# Patient Record
Sex: FEMALE | Race: ASIAN | NOT HISPANIC OR LATINO
[De-identification: names, ages, dates, MRNs, and addresses within clinical notes are randomized per-mention and may not be internally consistent; named-entity substitution may affect disease eponyms.]

---

## 2019-02-04 ENCOUNTER — NON-APPOINTMENT (OUTPATIENT)
Age: 49
End: 2019-02-04

## 2019-02-04 ENCOUNTER — APPOINTMENT (OUTPATIENT)
Dept: CARDIOLOGY | Facility: CLINIC | Age: 49
End: 2019-02-04
Payer: COMMERCIAL

## 2019-02-04 VITALS
OXYGEN SATURATION: 98 % | HEIGHT: 64 IN | DIASTOLIC BLOOD PRESSURE: 100 MMHG | BODY MASS INDEX: 37.39 KG/M2 | SYSTOLIC BLOOD PRESSURE: 164 MMHG | WEIGHT: 219 LBS | HEART RATE: 71 BPM

## 2019-02-04 DIAGNOSIS — Z83.3 FAMILY HISTORY OF DIABETES MELLITUS: ICD-10-CM

## 2019-02-04 DIAGNOSIS — Z87.442 PERSONAL HISTORY OF URINARY CALCULI: ICD-10-CM

## 2019-02-04 DIAGNOSIS — Z82.49 FAMILY HISTORY OF ISCHEMIC HEART DISEASE AND OTHER DISEASES OF THE CIRCULATORY SYSTEM: ICD-10-CM

## 2019-02-04 DIAGNOSIS — Z82.5 FAMILY HISTORY OF ASTHMA AND OTHER CHRONIC LOWER RESPIRATORY DISEASES: ICD-10-CM

## 2019-02-04 DIAGNOSIS — Z82.3 FAMILY HISTORY OF STROKE: ICD-10-CM

## 2019-02-04 PROCEDURE — 36415 COLL VENOUS BLD VENIPUNCTURE: CPT

## 2019-02-04 PROCEDURE — 99205 OFFICE O/P NEW HI 60 MIN: CPT

## 2019-02-04 PROCEDURE — 93000 ELECTROCARDIOGRAM COMPLETE: CPT

## 2019-02-05 LAB
ALBUMIN SERPL ELPH-MCNC: 4.4 G/DL
ALP BLD-CCNC: 74 U/L
ALT SERPL-CCNC: 32 U/L
ANION GAP SERPL CALC-SCNC: 13 MMOL/L
AST SERPL-CCNC: 32 U/L
BASOPHILS # BLD AUTO: 0.02 K/UL
BASOPHILS NFR BLD AUTO: 0.2 %
BILIRUB SERPL-MCNC: 0.4 MG/DL
BUN SERPL-MCNC: 16 MG/DL
CALCIUM SERPL-MCNC: 9.6 MG/DL
CHLORIDE SERPL-SCNC: 100 MMOL/L
CHOLEST SERPL-MCNC: 152 MG/DL
CHOLEST/HDLC SERPL: 3.3 RATIO
CO2 SERPL-SCNC: 27 MMOL/L
CREAT SERPL-MCNC: 0.8 MG/DL
EOSINOPHIL # BLD AUTO: 0.45 K/UL
EOSINOPHIL NFR BLD AUTO: 4.8 %
GLUCOSE SERPL-MCNC: 103 MG/DL
HCT VFR BLD CALC: 42.7 %
HDLC SERPL-MCNC: 46 MG/DL
HGB BLD-MCNC: 13.3 G/DL
IMM GRANULOCYTES NFR BLD AUTO: 0.6 %
LDLC SERPL CALC-MCNC: 87 MG/DL
LDLC SERPL DIRECT ASSAY-MCNC: 96 MG/DL
LYMPHOCYTES # BLD AUTO: 2.61 K/UL
LYMPHOCYTES NFR BLD AUTO: 27.8 %
MAN DIFF?: NORMAL
MCHC RBC-ENTMCNC: 28.5 PG
MCHC RBC-ENTMCNC: 31.1 GM/DL
MCV RBC AUTO: 91.4 FL
MONOCYTES # BLD AUTO: 0.47 K/UL
MONOCYTES NFR BLD AUTO: 5 %
NEUTROPHILS # BLD AUTO: 5.78 K/UL
NEUTROPHILS NFR BLD AUTO: 61.6 %
PLATELET # BLD AUTO: 257 K/UL
POTASSIUM SERPL-SCNC: 3.8 MMOL/L
PROT SERPL-MCNC: 7.7 G/DL
RBC # BLD: 4.67 M/UL
RBC # FLD: 13.7 %
SODIUM SERPL-SCNC: 140 MMOL/L
TRIGL SERPL-MCNC: 97 MG/DL
WBC # FLD AUTO: 9.39 K/UL

## 2019-02-05 NOTE — REASON FOR VISIT
[FreeTextEntry1] : Rachel Brown presents today for cardiac evaluation regarding HTN and palpitations.

## 2019-02-05 NOTE — DISCUSSION/SUMMARY
[FreeTextEntry1] : Palps - likely due to VPCs, based on recording patient brings with her.\par Will have her return for an echo to assess for hypertensive changes or other structural disease\par Will arrange for an event monitor for four weeks.\par \par HTN\par Continue current meds.\par Continue to monitor BP at home\par TTE to assess for hypertensive changes.\par Reviewed need for weight loss; suggested low fat or Weight Watchers diet.\par Low salt diet.\par \par Overweight; as above\par \par WIll check CBC, CMP and lipid panel.\par \par Return OV in one month

## 2019-02-05 NOTE — PHYSICAL EXAM
[Oriented To Time, Place, And Person] : oriented to person, place, and time [Impaired Insight] : insight and judgment were intact [Affect] : the affect was normal [Mood] : the mood was normal [General Appearance - Well Developed] : well developed [Normal Appearance] : normal appearance [Well Groomed] : well groomed [General Appearance - Well Nourished] : well nourished [No Deformities] : no deformities [General Appearance - In No Acute Distress] : no acute distress [Normal Conjunctiva] : the conjunctiva exhibited no abnormalities [Eyelids - No Xanthelasma] : the eyelids demonstrated no xanthelasmas [Normal Jugular Venous A Waves Present] : normal jugular venous A waves present [Normal Jugular Venous V Waves Present] : normal jugular venous V waves present [No Jugular Venous Giles A Waves] : no jugular venous giles A waves [Heart Rate And Rhythm] : heart rate and rhythm were normal [Respiration, Rhythm And Depth] : normal respiratory rhythm and effort [Exaggerated Use Of Accessory Muscles For Inspiration] : no accessory muscle use [Auscultation Breath Sounds / Voice Sounds] : lungs were clear to auscultation bilaterally [Bowel Sounds] : normal bowel sounds [Abnormal Walk] : normal gait [Nail Clubbing] : no clubbing of the fingernails [Cyanosis, Localized] : no localized cyanosis [Skin Color & Pigmentation] : normal skin color and pigmentation [] : no rash [FreeTextEntry1] : Trace edema, L>R

## 2019-02-05 NOTE — HISTORY OF PRESENT ILLNESS
[FreeTextEntry1] : Rachel Brown is a 48 year woman with a history of HTN over the past 10 years, treated with medication. Blood pressure has been reasonably well controlled; she tells me that the systolic reading can vary between 130 and 150, over diastolic readings of 60.\par \par She has a history of palpitations, typically occurring once or twice each week. 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 recently; she put on a telemetry monitor, which showed an isolated ventricular premature beat at the time of her symptoms.  Also, every 2 or 3 months, she has a stronger sense of palpitations, which require that she sit and rest. These episodes can last up to an hour, and she takes an extra 25 mg of Lopressor for relief.  These strong episodes of palpitations are accompanied by a mid-chest discomfort and sense of breathlessness. She remains active, working full time as a nurse; with her usual activities, she describes no exertional chest discomfort or dyspnea. She describes no episodes of loss of consciousness. She has never been told of a heart murmur in the past.\par \par There is no history of diabetes or hyperlipidemia. She has never been a cigarette smoker. There is no family history of premature heart disease.

## 2019-02-05 NOTE — ASSESSMENT
[FreeTextEntry1] : Palps - rhythm  strip from hospital showed isolated VPC.\par Will arrange for an event monitor to carry for four weeks.\par \par HTN - only fare control.  To continue current meds for now.  Will return for an echo to assess for hypertensive changes or for other structural abnormality.\par \par Overweight -  reviewed need for weight loss.\par \par Will check CBC, CMP and lipid panel.\par \par Return ov in one month to review echo and rhythm recordings; recheck BP.

## 2019-02-19 ENCOUNTER — APPOINTMENT (OUTPATIENT)
Dept: CARDIOLOGY | Facility: CLINIC | Age: 49
End: 2019-02-19
Payer: COMMERCIAL

## 2019-02-19 VITALS — SYSTOLIC BLOOD PRESSURE: 146 MMHG | DIASTOLIC BLOOD PRESSURE: 90 MMHG

## 2019-02-19 VITALS — SYSTOLIC BLOOD PRESSURE: 202 MMHG | DIASTOLIC BLOOD PRESSURE: 98 MMHG | HEART RATE: 72 BPM

## 2019-02-19 PROCEDURE — 93306 TTE W/DOPPLER COMPLETE: CPT

## 2019-03-04 ENCOUNTER — NON-APPOINTMENT (OUTPATIENT)
Age: 49
End: 2019-03-04

## 2019-03-04 ENCOUNTER — APPOINTMENT (OUTPATIENT)
Dept: CARDIOLOGY | Facility: CLINIC | Age: 49
End: 2019-03-04
Payer: COMMERCIAL

## 2019-03-04 VITALS
SYSTOLIC BLOOD PRESSURE: 172 MMHG | HEART RATE: 96 BPM | HEIGHT: 64 IN | BODY MASS INDEX: 37.39 KG/M2 | RESPIRATION RATE: 14 BRPM | DIASTOLIC BLOOD PRESSURE: 90 MMHG | WEIGHT: 219 LBS | OXYGEN SATURATION: 92 %

## 2019-03-04 PROCEDURE — 93000 ELECTROCARDIOGRAM COMPLETE: CPT

## 2019-03-04 PROCEDURE — 99215 OFFICE O/P EST HI 40 MIN: CPT

## 2019-03-04 NOTE — HISTORY OF PRESENT ILLNESS
[FreeTextEntry1] : She has a history of HTN for 10 years.  She has a history of palpitations, typically occurring once or twice each week. 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  She still has been experiencing palpitations and has been using the event monitor.  She reports no exertional chest discomfort or dyspnea. She describes no lightheadedness or LOC.  \par \par She continues to tolerate her meds without problem.

## 2019-03-04 NOTE — ASSESSMENT
[FreeTextEntry1] : Palps - rhythm  strip from hospital showed isolated VPCs.\par Will arrange for an event monitor to carry for four weeks.\par \par HTN - only fare control.  To continue current meds for now.  Will return for an echo to assess for hypertensive changes or for other structural abnormality.\par \par Overweight -  reviewed need for weight loss.\par \par Will check CBC, CMP and lipid panel.\par \par Return ov in one month to review echo and rhythm recordings; recheck BP.

## 2019-03-04 NOTE — DISCUSSION/SUMMARY
[FreeTextEntry1] : Palps - event monitor demonstrates that atrial ectopy including brief runs of PAT which appears to be the cause of Rachel's symptoms.  \par \par I reviewed that finding of her recent TTE with her; it demonstrates mild hypertensive findings, with evidence of mild LVH and mild diastolic dysfunction, which likely contributes to her propensity to atrial ectopy. \par \par She will increase metoprolol ER to 100 mg. qd., to better suppress her ectopy and to help with BP control. I explained the common side effects of fatigue and sexual dysfunction.\par \par She may return the event monitor.\par \par HTN - continue losartan/HCT and metoprolol.  Continue low salt diet.\par \par I reviewed the need to lose weight, as this is contributing to her HTN.  I suggested that she try Weight Watchers.\par \par Reviewed recent blood work, which was favorable.\par \par Return OV in one month.

## 2019-03-04 NOTE — PHYSICAL EXAM
[General Appearance - Well Developed] : well developed [Normal Appearance] : normal appearance [Well Groomed] : well groomed [General Appearance - Well Nourished] : well nourished [No Deformities] : no deformities [General Appearance - In No Acute Distress] : no acute distress [Normal Conjunctiva] : the conjunctiva exhibited no abnormalities [Eyelids - No Xanthelasma] : the eyelids demonstrated no xanthelasmas [Normal Jugular Venous A Waves Present] : normal jugular venous A waves present [Normal Jugular Venous V Waves Present] : normal jugular venous V waves present [No Jugular Venous Giles A Waves] : no jugular venous giles A waves [Respiration, Rhythm And Depth] : normal respiratory rhythm and effort [Exaggerated Use Of Accessory Muscles For Inspiration] : no accessory muscle use [Auscultation Breath Sounds / Voice Sounds] : lungs were clear to auscultation bilaterally [Heart Rate And Rhythm] : heart rate and rhythm were normal [Bowel Sounds] : normal bowel sounds [Abnormal Walk] : normal gait [Nail Clubbing] : no clubbing of the fingernails [Cyanosis, Localized] : no localized cyanosis [Skin Color & Pigmentation] : normal skin color and pigmentation [] : no rash [Oriented To Time, Place, And Person] : oriented to person, place, and time [Impaired Insight] : insight and judgment were intact [Affect] : the affect was normal [Mood] : the mood was normal [FreeTextEntry1] : Trace edema, L>R

## 2019-04-02 ENCOUNTER — APPOINTMENT (OUTPATIENT)
Dept: CARDIOLOGY | Facility: CLINIC | Age: 49
End: 2019-04-02
Payer: COMMERCIAL

## 2019-04-02 ENCOUNTER — NON-APPOINTMENT (OUTPATIENT)
Age: 49
End: 2019-04-02

## 2019-04-02 VITALS
HEART RATE: 62 BPM | DIASTOLIC BLOOD PRESSURE: 91 MMHG | SYSTOLIC BLOOD PRESSURE: 158 MMHG | BODY MASS INDEX: 36.19 KG/M2 | RESPIRATION RATE: 14 BRPM | WEIGHT: 212 LBS | HEIGHT: 64 IN

## 2019-04-02 PROCEDURE — 93000 ELECTROCARDIOGRAM COMPLETE: CPT

## 2019-04-02 PROCEDURE — 99214 OFFICE O/P EST MOD 30 MIN: CPT

## 2019-04-02 RX ORDER — METOPROLOL SUCCINATE 50 MG/1
50 TABLET, EXTENDED RELEASE ORAL
Qty: 90 | Refills: 1 | Status: DISCONTINUED | COMMUNITY
End: 2019-04-02

## 2019-04-02 NOTE — DISCUSSION/SUMMARY
[FreeTextEntry1] : Palps - event monitor demonstrated that atrial ectopy, including brief runs of PAT.  Adequate suppressed with beta blocker.\par \par HTN and mild hypertensive dz./diastolic dysfunction; BP not ideally controlled.  Will switch to carvedilol ER at 20 mg per day, for additional BP benefit and to continue beta blocker for palps.  Continue low salt diet.\par \par I commended her for losing 7 lbs. since her last visit; I encouraged her to lose more weight.\par \par She will return in six weeks.

## 2019-04-02 NOTE — HISTORY OF PRESENT ILLNESS
[FreeTextEntry1] : She has a history of HTN for 10 years.  She has a history of palpitations, typically occurring once or twice each week. 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  She reports few palpitations and is tolerating the higher dose of metoprolol without problem.  She reports no exertional chest discomfort or dyspnea. She describes no lightheadedness or LOC.  \par \par There have been no new interval medical problems.

## 2019-05-15 ENCOUNTER — APPOINTMENT (OUTPATIENT)
Dept: CARDIOLOGY | Facility: CLINIC | Age: 49
End: 2019-05-15

## 2019-05-15 NOTE — HISTORY OF PRESENT ILLNESS
[FreeTextEntry1] : She has a history of HTN.  She has a history of palpitations, typically occurring once or twice each week. 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  She reports few palpitations and is tolerating the higher dose of metoprolol without problem.  She reports no exertional chest discomfort or dyspnea. She describes no lightheadedness or LOC.  \par \par There have been no new interval medical problems.

## 2019-05-15 NOTE — PHYSICAL EXAM
[General Appearance - Well Developed] : well developed [Normal Appearance] : normal appearance [General Appearance - Well Nourished] : well nourished [Well Groomed] : well groomed [General Appearance - In No Acute Distress] : no acute distress [No Deformities] : no deformities [Eyelids - No Xanthelasma] : the eyelids demonstrated no xanthelasmas [Normal Conjunctiva] : the conjunctiva exhibited no abnormalities [Normal Jugular Venous V Waves Present] : normal jugular venous V waves present [Normal Jugular Venous A Waves Present] : normal jugular venous A waves present [No Jugular Venous Giles A Waves] : no jugular venous giles A waves [Respiration, Rhythm And Depth] : normal respiratory rhythm and effort [Exaggerated Use Of Accessory Muscles For Inspiration] : no accessory muscle use [Auscultation Breath Sounds / Voice Sounds] : lungs were clear to auscultation bilaterally [Heart Rate And Rhythm] : heart rate and rhythm were normal [Bowel Sounds] : normal bowel sounds [Abnormal Walk] : normal gait [Nail Clubbing] : no clubbing of the fingernails [Cyanosis, Localized] : no localized cyanosis [Skin Color & Pigmentation] : normal skin color and pigmentation [] : no rash [Impaired Insight] : insight and judgment were intact [Oriented To Time, Place, And Person] : oriented to person, place, and time [Mood] : the mood was normal [Affect] : the affect was normal [FreeTextEntry1] : Trace edema, L>R

## 2019-05-15 NOTE — ASSESSMENT
[FreeTextEntry1] : Palps - rhythm  strip from hospital showed isolated VPCs.  Event monitor showed episodes of APCs, PAT and occ. PVCs.\par \par HTN, mild concentric LVH, mild diastolic dysfunction\par \par Overweight

## 2019-05-15 NOTE — DISCUSSION/SUMMARY
[FreeTextEntry1] : PRELIMINARY NOTE\par \par \par \par Palps - event monitor demonstrated that atrial ectopy, brief runs of PAT and occ. VPCs.  Adequate suppressed with beta blocker.\par \par HTN and mild hypertensive dz./diastolic dysfunction; BP not ideally controlled.  Will switch to carvedilol ER at 20 mg per day, for additional BP benefit and to continue beta blocker for palps.  Continue low salt diet.\par \par I commended her for losing 7 lbs. since her last visit; I encouraged her to lose more weight.\par \par She will return in six weeks.

## 2019-07-22 ENCOUNTER — APPOINTMENT (OUTPATIENT)
Dept: CARDIOLOGY | Facility: CLINIC | Age: 49
End: 2019-07-22
Payer: COMMERCIAL

## 2019-07-22 NOTE — DISCUSSION/SUMMARY
[FreeTextEntry1] : PRELIMINARY NOTE \par \par \par Palps - event monitor demonstrated that atrial ectopy, including brief runs of PAT.  Adequate suppressed with beta blocker.\par \par HTN and mild hypertensive dz./diastolic dysfunction; BP not ideally controlled.  Will switch to carvedilol ER at 20 mg per day, for additional BP benefit and to continue beta blocker for palps.  Continue low salt diet.\par \par I commended her for losing 7 lbs. since her last visit; I encouraged her to lose more weight.\par \par She will return in six weeks.

## 2019-07-22 NOTE — PHYSICAL EXAM
[General Appearance - Well Developed] : well developed [Normal Appearance] : normal appearance [Well Groomed] : well groomed [General Appearance - Well Nourished] : well nourished [No Deformities] : no deformities [General Appearance - In No Acute Distress] : no acute distress [Normal Conjunctiva] : the conjunctiva exhibited no abnormalities [Eyelids - No Xanthelasma] : the eyelids demonstrated no xanthelasmas [Normal Jugular Venous A Waves Present] : normal jugular venous A waves present [Normal Jugular Venous V Waves Present] : normal jugular venous V waves present [No Jugular Venous Giles A Waves] : no jugular venous giles A waves [Respiration, Rhythm And Depth] : normal respiratory rhythm and effort [Exaggerated Use Of Accessory Muscles For Inspiration] : no accessory muscle use [Auscultation Breath Sounds / Voice Sounds] : lungs were clear to auscultation bilaterally [Heart Rate And Rhythm] : heart rate and rhythm were normal [Bowel Sounds] : normal bowel sounds [Abnormal Walk] : normal gait [Nail Clubbing] : no clubbing of the fingernails [Cyanosis, Localized] : no localized cyanosis [FreeTextEntry1] : Trace edema, L>R [Skin Color & Pigmentation] : normal skin color and pigmentation [] : no rash [Impaired Insight] : insight and judgment were intact [Oriented To Time, Place, And Person] : oriented to person, place, and time [Affect] : the affect was normal [Mood] : the mood was normal

## 2019-07-22 NOTE — REASON FOR VISIT
[FreeTextEntry1] : PRELIMINARY NOTE\par \par \par Rachel Brown returns for f/u regarding HTN and palpitations.

## 2019-07-22 NOTE — ASSESSMENT
[FreeTextEntry1] : Palps - event monitor showed SVPBs and short runs of PAT; rare BPBs seen.\par \par HTN.\par \par Overweight

## 2019-07-22 NOTE — HISTORY OF PRESENT ILLNESS
[FreeTextEntry1] : She has a history of HTN.  She has a history of palpitations, typically occurring once or twice each week. 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  She reports few palpitations and is tolerating the higher dose of metoprolol without problem.  She reports no exertional chest discomfort or dyspnea. She describes no lightheadedness or LOC.  \par \par There have been no new interval medical problems.

## 2019-09-18 ENCOUNTER — MEDICATION RENEWAL (OUTPATIENT)
Age: 49
End: 2019-09-18

## 2019-09-24 ENCOUNTER — NON-APPOINTMENT (OUTPATIENT)
Age: 49
End: 2019-09-24

## 2019-09-24 ENCOUNTER — APPOINTMENT (OUTPATIENT)
Dept: CARDIOLOGY | Facility: CLINIC | Age: 49
End: 2019-09-24
Payer: COMMERCIAL

## 2019-09-24 VITALS
DIASTOLIC BLOOD PRESSURE: 86 MMHG | BODY MASS INDEX: 36.7 KG/M2 | OXYGEN SATURATION: 94 % | WEIGHT: 215 LBS | SYSTOLIC BLOOD PRESSURE: 153 MMHG | TEMPERATURE: 98.1 F | HEART RATE: 68 BPM | RESPIRATION RATE: 17 BRPM | HEIGHT: 64 IN

## 2019-09-24 PROCEDURE — 93000 ELECTROCARDIOGRAM COMPLETE: CPT

## 2019-09-24 PROCEDURE — 99214 OFFICE O/P EST MOD 30 MIN: CPT

## 2019-09-24 RX ORDER — LOSARTAN POTASSIUM AND HYDROCHLOROTHIAZIDE 25; 100 MG/1; MG/1
100-25 TABLET ORAL DAILY
Qty: 90 | Refills: 1 | Status: DISCONTINUED | COMMUNITY
End: 2019-09-24

## 2019-09-24 NOTE — DISCUSSION/SUMMARY
[FreeTextEntry1] : \par Palps - event monitor demonstrated atrial ectopy, including brief runs of PAT. Adequately suppressed with carvedilol.\par \par HTN and mild hypertensive dz./diastolic dysfunction; BP not ideally controlled based on readings in office.  Blood pressure readings at home seem to be more typically in range.  For now we will continue carvedilol ER at 20 mg per day, but will switch to telmisartan HCT, given recent issues with further recall of losartan.  Continue low salt diet.\par If necessary, will consider increasing Coreg ER to 40 mg at next visit.\par \par I again reminded her of the importance of weight loss as this would certainly help her blood pressure.  \par \par She will return in 2 months.

## 2019-09-24 NOTE — HISTORY OF PRESENT ILLNESS
[FreeTextEntry1] : She has a history of HTN for 10 years.  She has a history of palpitations, typically occurring once or twice each week. \par \par I saw her last in April  As she returns today, she remains well.  She continues all of her usual activities.  Her palpitations seem adequately suppressed by carvedilol.  She seems to be tolerating it without problem.  She remains on losartan HCT as well.\par \par She does check her blood pressure periodically at home.  Systolic readings range from a low of 106 to a high of approximately 150.  Diastolic readings have been within normal range.\par \par She reports no exertional chest discomfort or dyspnea. She describes no lightheadedness or LOC.

## 2019-09-24 NOTE — ASSESSMENT
[FreeTextEntry1] : Palps - rhythm  strip from hospital showed isolated VPCs; event monitor showed brief runs of PAT.\par \par HTN/mild hypertensive heart disease.\par \par Overweight.\par

## 2019-11-22 ENCOUNTER — APPOINTMENT (OUTPATIENT)
Dept: CARDIOLOGY | Facility: CLINIC | Age: 49
End: 2019-11-22

## 2019-11-22 NOTE — HISTORY OF PRESENT ILLNESS
[FreeTextEntry1] : She has a history of HTN for 10 years.  She has a history of palpitations, typically occurring once or twice each week. \par \par I saw her last in Sept.  As she returns today, \par \par \par she remains well.  She continues all of her usual activities.  Her palpitations seem adequately suppressed by carvedilol.  She seems to be tolerating it without problem.  She remains on losartan HCT as well.\par \par She does check her blood pressure periodically at home.  Systolic readings range from a low of 106 to a high of approximately 150.  Diastolic readings have been within normal range.\par \par She reports no exertional chest discomfort or dyspnea. She describes no lightheadedness or LOC.

## 2019-11-22 NOTE — REASON FOR VISIT
[FreeTextEntry1] : PATIENT CANCELLED APPT. \par \par \par \par PRELIMINARY NOTE\par \par \par \par Rachel Brown returns for f/u regarding HTN and palpitations.

## 2019-11-22 NOTE — DISCUSSION/SUMMARY
[FreeTextEntry1] : PRELIMINARY NOTE\par \par \par Palps - event monitor demonstrated atrial ectopy, including brief runs of PAT. Adequately suppressed with carvedilol.\par \par HTN and mild hypertensive dz./diastolic dysfunction; BP not ideally controlled based on readings in office.  Blood pressure readings at home seem to be more typically in range.  For now we will continue carvedilol ER at 20 mg per day, but will switch to telmisartan HCT, given recent issues with further recall of losartan.  Continue low salt diet.\par If necessary, will consider increasing Coreg ER to 40 mg at next visit.\par \par I again reminded her of the importance of weight loss as this would certainly help her blood pressure.  \par \par She will return in 2 months.

## 2020-01-08 ENCOUNTER — TRANSCRIPTION ENCOUNTER (OUTPATIENT)
Age: 50
End: 2020-01-08

## 2020-04-25 ENCOUNTER — MESSAGE (OUTPATIENT)
Age: 50
End: 2020-04-25

## 2020-05-05 ENCOUNTER — APPOINTMENT (OUTPATIENT)
Dept: DISASTER EMERGENCY | Facility: CLINIC | Age: 50
End: 2020-05-05

## 2020-06-29 ENCOUNTER — APPOINTMENT (OUTPATIENT)
Dept: CARDIOLOGY | Facility: CLINIC | Age: 50
End: 2020-06-29
Payer: COMMERCIAL

## 2020-06-29 ENCOUNTER — NON-APPOINTMENT (OUTPATIENT)
Age: 50
End: 2020-06-29

## 2020-06-29 VITALS
OXYGEN SATURATION: 97 % | TEMPERATURE: 97.7 F | DIASTOLIC BLOOD PRESSURE: 86 MMHG | HEART RATE: 68 BPM | HEIGHT: 64 IN | SYSTOLIC BLOOD PRESSURE: 128 MMHG | WEIGHT: 215 LBS | BODY MASS INDEX: 36.7 KG/M2

## 2020-06-29 DIAGNOSIS — Z00.00 ENCOUNTER FOR GENERAL ADULT MEDICAL EXAMINATION W/OUT ABNORMAL FINDINGS: ICD-10-CM

## 2020-06-29 PROCEDURE — 99214 OFFICE O/P EST MOD 30 MIN: CPT

## 2020-06-29 PROCEDURE — 93000 ELECTROCARDIOGRAM COMPLETE: CPT

## 2020-06-29 RX ORDER — BENZONATATE 200 MG/1
200 CAPSULE ORAL
Qty: 21 | Refills: 0 | Status: COMPLETED | COMMUNITY
Start: 2020-01-08

## 2020-06-29 RX ORDER — MECLIZINE HYDROCHLORIDE 12.5 MG/1
12.5 TABLET ORAL
Qty: 30 | Refills: 0 | Status: COMPLETED | COMMUNITY
Start: 2020-01-15

## 2020-06-29 RX ORDER — OSELTAMIVIR PHOSPHATE 75 MG/1
75 CAPSULE ORAL
Qty: 10 | Refills: 0 | Status: COMPLETED | COMMUNITY
Start: 2020-01-08

## 2020-06-29 RX ORDER — HYDROCHLOROTHIAZIDE 25 MG/1
25 TABLET ORAL
Qty: 30 | Refills: 0 | Status: COMPLETED | COMMUNITY
Start: 2019-11-22

## 2020-06-29 RX ORDER — METHYLPREDNISOLONE 4 MG/1
4 TABLET ORAL
Qty: 21 | Refills: 0 | Status: COMPLETED | COMMUNITY
Start: 2020-01-15

## 2020-06-29 RX ORDER — CROMOLYN SODIUM 5.2 MG/ML
5.2 SPRAY, METERED NASAL
Qty: 26 | Refills: 0 | Status: COMPLETED | COMMUNITY
Start: 2020-01-08

## 2020-06-29 RX ORDER — TELMISARTAN 80 MG/1
80 TABLET ORAL
Qty: 30 | Refills: 0 | Status: COMPLETED | COMMUNITY
Start: 2020-04-02

## 2020-06-29 RX ORDER — DIPHENHYDRAMINE HYDROCHLORIDE AND LIDOCAINE HYDROCHLORIDE AND ALUMINUM HYDROXIDE AND MAGNESIUM HYDRO
KIT
Qty: 237 | Refills: 0 | Status: COMPLETED | COMMUNITY
Start: 2020-01-08

## 2020-06-29 NOTE — ASSESSMENT
H&P- Cameron Thompson 11/15/1924, 80 y o  male MRN: [id number]    Unit/Bed#: -01 Encounter: [id number]    Primary Care Provider: Snadra Johnson   Date and time admitted to hospital: 2/16/2020  5:10 PM        * ELEAZAR (acute kidney injury) [facility])  Assessment & Plan  · Placed in observation Medicine  · Likely prerenal secondary to volume depletion  · Hydrate with normal saline at 100 cc/hour  · Will hold pre-hospital Lasix  · Consult Nephrology in a m  Diarrhea of presumed infectious origin  Assessment & Plan  · Will place on contact precautions  · Stool studies are currently pending    Chronic systolic CHF (congestive heart failure) (Formerly [facility])  Assessment & Plan  Wt Readings from Last 3 Encounters:   02/16/20 68 1 kg (150 lb 2 1 oz)   07/30/19 68 9 kg (152 lb)   01/25/19 68 kg (150 lb)     Will continue pre-hospital Lopressor 25 mg p o  B i d , Lasix is currently on hold secondary to acute kidney injury, will obtain daily weights        Hypercholesterolemia  Assessment & Plan  Substitute Pravachol 80 mg p o  Daily for pre-hospital Zocor    DVT (deep venous thrombosis) ([facility])  Assessment & Plan  Continue pre-hospital Eliquis 5 mg p o  Daily    COPD (chronic obstructive pulmonary disease) (Formerly [facility])  Assessment & Plan  Continue pre-hospital Symbicort 160/4 52 puffs b i d  And give Duo nebs duo nebs q 6 hours while awake  VTE Prophylaxis: Apixaban (Eliquis)  Code Status:  Level 3  POLST: There is no POLST form on file for this patient (pre-hospital)  Discussion with family:  None present at bedside at time of exam    Anticipated Length of Stay:  Patient will be admitted on an Observation basis with an anticipated length of stay of  < 2 midnights  Justification for Hospital Stay:  Acute kidney injury likely secondary to dehydration from diarrhea requiring IV fluid resuscitation and further evaluation    Chief Complaint:   Diarrhea times 5 days    History of Present Illness:    Cameron Thompson is a 80 y  o  [FreeTextEntry1] : Palps - rhythm  strip from hospital showed isolated VPCs; event monitor showed brief runs of PAT.\par \par HTN/mild hypertensive heart disease.\par \par Overweight.\par  male who presents with diarrhea x5 days  Patient reports over the past 5 days he has had multiple episodes of diarrhea approximately 4-6 times per day which is watery in nature and not accompanied with any blood or mucus  Patient denies any abdominal pain, no fever chills, no recent sick contacts were bad food exposure  Patient denies any recent antibiotic usage  Review of Systems:  Review of Systems   Constitutional: Positive for fatigue  Negative for appetite change, chills and fever  Respiratory: Negative for cough, shortness of breath and wheezing  Cardiovascular: Negative for chest pain and palpitations  Gastrointestinal: Positive for diarrhea  Negative for abdominal pain, nausea and vomiting  Genitourinary: Negative for dysuria, frequency, hematuria and urgency  All other systems reviewed and are negative  Past Medical and Surgical History:   Past Medical History:   Diagnosis Date    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     COPD [facility]chronic obstructive pulmonary disease) ([facility] )     Hypercholesteremia     Hyperlipidemia     Malignant neoplasm prostate ([facility] )     Phimosis     Stress incontinence     Urinary retention     Urinary urgency        Past Surgical History:   Procedure Laterality Date    BLADDER FULGURATION  2012, 2013    CIRCUMCISION, [place]  2013    PROSTATECTOMY  1995    TOTAL HIP ARTHROPLASTY Left 2012    URINARY SPHINCTER IMPLANT  1996    Repaired in 2002; Removed in 2012    UROFLOWMETRY SIMPLE / COMPLEX  1996       Meds/Allergies:  Prior to Admission medications    Medication Sig Start Date End Date Taking? Authorizing Provider   apixaban (ELIQUIS) 5 mg Take 5 mg by mouth 2 (two) times a day 3/26/19  Yes Historical Provider, MD   budesonide-formoterol (SYMBICORT) 160-4 5 mcg/act inhaler Inhale 2 puffs 2 (two) times a day Rinse mouth after use     Yes Historical Provider, MD   furosemide (LASIX) 40 mg tablet Take 1 tablet (40 mg total) by mouth daily 11/14/18 Yes SEEMA Hernández   loperamide (IMODIUM) 2 mg capsule Take 2 mg by mouth 4 (four) times a day as needed for diarrhea   Yes Historical Provider, MD   meclizine (ANTIVERT) 25 mg tablet Take 25 mg by mouth daily at bedtime    Yes Historical Provider, MD   metoprolol tartrate (LOPRESSOR) 25 mg tablet Take 1 tablet (25 mg total) by mouth every 12 (twelve) hours for 30 days  Patient taking differently: Take 25 mg by mouth 2 (two) times a day  11/13/18 2/16/20 Yes SEEMA Hernández   Multiple Vitamin (MULTIVITAMIN) tablet Take 1 tablet by mouth daily   Yes Historical Provider, MD   oxybutynin (DITROPAN XL) 15 MG 24 hr tablet Take 1 tablet (15 mg total) by mouth daily at bedtime 11/18/19  Yes SEEMA Montgomery   potassium chloride (K-DUR,KLOR-CON) 10 mEq tablet Take 1 tablet (10 mEq total) by mouth daily for 30 doses 11/14/18 2/16/20 Yes SEEMA Hernández   simvastatin (ZOCOR) 40 mg tablet Take 40 mg by mouth daily at bedtime   Yes Historical Provider, MD   acetaminophen (TYLENOL) 325 mg tablet Take 650 mg by mouth every 6 (six) hours as needed for mild pain    Historical Provider, MD   acetaminophen-codeine (TYLENOL #3) 300-30 mg per tablet Take 1-2 tablets by mouth every 6 (six) hours as needed for moderate pain for up to 15 doses  Patient not taking: Reported on 7/30/2019 1/25/19   Raynette Nissen, MD   COMBIVENT RESPIMAT inhaler  7/5/18   Historical Provider, MD   docusate sodium (COLACE) 100 mg capsule Take 1 capsule (100 mg total) by mouth 2 (two) times a day  Patient not taking: Reported on 7/30/2019 11/13/18   SEEMA Hernández   zinc oxide 20 % ointment Apply topically as needed for irritation Apply ice excoriated area around SP tube insertion site  Patient may self administer 1/14/20   SEEMA Montgomery     I have reviewed home medications with patient personally  Allergies:    Allergies   Allergen Reactions    Prednisone Shortness Of Breath    Ciprofloxacin Other (See Comments)       Social History:  Marital Status: /Civil Union   Occupation:  Retired  Patient Pre-hospital Living Situation:  Resides at home in senior living  Patient Pre-hospital Level of Mobility:  Full with assist of a cane and sometimes walker  Patient Pre-hospital Diet Restrictions:  None  Substance Use History:   Social History     Substance and Sexual Activity   Alcohol Use Never    Frequency: Never     Social History     Tobacco Use   Smoking Status Former Smoker    Packs/day: 0 50    Types: Cigarettes    Start date: 1947   Smokeless Tobacco Never Used     Social History     Substance and Sexual Activity   Drug Use No       Family History:  I have reviewed the patients family history    Physical Exam:   Vitals:   Blood Pressure: 148/68 (02/16/20 2030)  Pulse: 85 (02/16/20 2030)  Temperature: 98 2 °F (36 8 °C) (02/16/20 2030)  Temp Source: Tympanic (02/16/20 2030)  Respirations: 18 (02/16/20 2030)  Height: 6' (182 9 cm) (02/16/20 2030)  Weight - Scale: 68 1 kg (150 lb 2 1 oz) (02/16/20 2050)  SpO2: 99 % (02/16/20 2030)    Physical Exam   Constitutional: He is oriented to person, place, and time  He appears well-developed and well-nourished  HENT:   Head: Normocephalic and atraumatic  Right Ear: Decreased hearing is noted  Left Ear: Decreased hearing is noted  Mouth/Throat: No oropharyngeal exudate  Eyes: Pupils are equal, round, and reactive to light  EOM are normal  No scleral icterus  Neck: Normal range of motion  Neck supple  No JVD present  Cardiovascular: Normal rate and regular rhythm  Murmur heard  Pulmonary/Chest: Effort normal and breath sounds normal  No respiratory distress  He has no wheezes  He has no rales  Abdominal: Soft  Bowel sounds are increased  There is no tenderness  There is no rebound and no guarding  Musculoskeletal: Normal range of motion  He exhibits no edema  Lymphadenopathy:     He has no cervical adenopathy     Neurological: He is alert and oriented to person, place, and time  Skin: Skin is warm and dry  No rash noted  No erythema  Psychiatric: He has a normal mood and affect  His behavior is normal    Nursing note and vitals reviewed  Additional Data:   Lab Results: I have personally reviewed pertinent reports  Results from last 7 days   Lab Units 02/16/20  1723   WBC Thousand/uL 10 50   HEMOGLOBIN g/dL 14 5   HEMATOCRIT % 46 3   PLATELETS Thousands/uL 173   NEUTROS PCT % 55   LYMPHS PCT % 35   MONOS PCT % 9   EOS PCT % 1     Results from last 7 days   Lab Units 02/16/20  1723   POTASSIUM mmol/L 4 6   CHLORIDE mmol/L 101   CO2 mmol/L 27   BUN mg/dL 24   CREATININE mg/dL 1 57*   CALCIUM mg/dL 8 9   ALK PHOS U/L 57   ALT U/L 15   AST U/L 31                   Imaging: I have personally reviewed pertinent reports  CT abdomen pelvis wo contrast   Final Result by Stanton Buckley MD ([id number])      No definitive acute abnormality in the abdomen or pelvis  Colonic diverticulosis without evidence of diverticulitis, noting that a short segment of the sigmoid colon is obscured by intense streak artifact from left hip arthroplasty  Watery stool in the colon is consistent with diarrheal state  CT might not be sensitive for low-grade enterocolitis  4 0 cm infrarenal abdominal aortic aneurysm  Additional chronic findings as described  Workstation performed: [id number]             EKG, Pathology, and Other Studies Reviewed on Admission:   · EKG: N/A    NetAccess / Epic Records Reviewed: Yes     ** Please Note: This note has been constructed using a voice recognition system   **

## 2020-06-29 NOTE — PHYSICAL EXAM
[General Appearance - Well Developed] : well developed [Normal Appearance] : normal appearance [Well Groomed] : well groomed [General Appearance - Well Nourished] : well nourished [No Deformities] : no deformities [General Appearance - In No Acute Distress] : no acute distress [Normal Conjunctiva] : the conjunctiva exhibited no abnormalities [Eyelids - No Xanthelasma] : the eyelids demonstrated no xanthelasmas [Normal Jugular Venous A Waves Present] : normal jugular venous A waves present [Normal Jugular Venous V Waves Present] : normal jugular venous V waves present [No Jugular Venous Giles A Waves] : no jugular venous giles A waves [Respiration, Rhythm And Depth] : normal respiratory rhythm and effort [Exaggerated Use Of Accessory Muscles For Inspiration] : no accessory muscle use [Auscultation Breath Sounds / Voice Sounds] : lungs were clear to auscultation bilaterally [Heart Rate And Rhythm] : heart rate and rhythm were normal [Bowel Sounds] : normal bowel sounds [Abnormal Walk] : normal gait [Nail Clubbing] : no clubbing of the fingernails [Cyanosis, Localized] : no localized cyanosis [Skin Color & Pigmentation] : normal skin color and pigmentation [] : no rash [Oriented To Time, Place, And Person] : oriented to person, place, and time [Impaired Insight] : insight and judgment were intact [Affect] : the affect was normal [Mood] : the mood was normal [FreeTextEntry1] : PMI not displaced.  Normal S1 and S2.  No murmur, rub or gallop appreciated.

## 2020-06-29 NOTE — DISCUSSION/SUMMARY
[FreeTextEntry1] : \par Palps - event monitor demonstrated atrial ectopy, including brief runs of PAT. \par  - will continue current dose of carvedilol.\par  - will check CBC, CMP, TSH.  \par  - depending on lab results, will consider repeating an event monitor.\par \par HTN and mild hypertensive dz./diastolic dysfunction; BP control is reasonable.  To continue current telmisartan and Coreg dosage.  Continue low salt diet; again encouraged weight loss.\par \par She will return in 3 weeks.

## 2020-06-29 NOTE — HISTORY OF PRESENT ILLNESS
[FreeTextEntry1] : She has a history of HTN for 10 years.  She has a history of palpitations, typically occurring once or twice each week. \par \par Over that past 2 months, she reports increasing palps again.  She has a Ak[employer] laura on her phone, which shows periods of irregular tachycardia up to HR of 140 bpm; one can not conclusively determine if the arrhythmia is A.F. or some other SVT.  She continues on her usual meds, and sometimes takes a dose of metoprolol PRN, which does help.  Palps can last for 2-3 hours; if she takes metoprolol, they resolve in about an hour.  She can not discern between isolated premature beats vs. sustained arrhythmia.\par \par She reports no exertional chest discomfort or dyspnea. She describes no lightheadedness or LOC.   There have been no episodes of orthopnea or PND. Other than a cold during the winter, she reports no other interval medical problems.  The dose of Coreg CR was increased to 40 mg. qd since I last saw her.

## 2020-07-06 LAB
ALBUMIN SERPL ELPH-MCNC: 4.7 G/DL
ALP BLD-CCNC: 68 U/L
ALT SERPL-CCNC: 31 U/L
ANION GAP SERPL CALC-SCNC: 16 MMOL/L
AST SERPL-CCNC: 26 U/L
BASOPHILS # BLD AUTO: 0.03 K/UL
BASOPHILS NFR BLD AUTO: 0.3 %
BILIRUB SERPL-MCNC: 0.8 MG/DL
BUN SERPL-MCNC: 21 MG/DL
CALCIUM SERPL-MCNC: 10 MG/DL
CHLORIDE SERPL-SCNC: 101 MMOL/L
CHOLEST SERPL-MCNC: 177 MG/DL
CHOLEST/HDLC SERPL: 3.8 RATIO
CO2 SERPL-SCNC: 24 MMOL/L
CREAT SERPL-MCNC: 0.74 MG/DL
EOSINOPHIL # BLD AUTO: 0.32 K/UL
EOSINOPHIL NFR BLD AUTO: 3.2 %
GLUCOSE SERPL-MCNC: 98 MG/DL
HCT VFR BLD CALC: 43.8 %
HDLC SERPL-MCNC: 46 MG/DL
HGB BLD-MCNC: 13.3 G/DL
IMM GRANULOCYTES NFR BLD AUTO: 0.5 %
LDLC SERPL CALC-MCNC: 103 MG/DL
LDLC SERPL DIRECT ASSAY-MCNC: 108 MG/DL
LYMPHOCYTES # BLD AUTO: 2.12 K/UL
LYMPHOCYTES NFR BLD AUTO: 21.1 %
MAN DIFF?: NORMAL
MCHC RBC-ENTMCNC: 28.5 PG
MCHC RBC-ENTMCNC: 30.4 GM/DL
MCV RBC AUTO: 94 FL
MONOCYTES # BLD AUTO: 0.62 K/UL
MONOCYTES NFR BLD AUTO: 6.2 %
NEUTROPHILS # BLD AUTO: 6.89 K/UL
NEUTROPHILS NFR BLD AUTO: 68.7 %
PLATELET # BLD AUTO: 241 K/UL
POTASSIUM SERPL-SCNC: 4.2 MMOL/L
PROT SERPL-MCNC: 7.8 G/DL
RBC # BLD: 4.66 M/UL
RBC # FLD: 13 %
SARS-COV-2 IGG SERPL IA-ACNC: 0.11 INDEX
SARS-COV-2 IGG SERPL QL IA: NEGATIVE
SODIUM SERPL-SCNC: 141 MMOL/L
T3 SERPL-MCNC: 104 NG/DL
T4 SERPL-MCNC: 7.7 UG/DL
TRIGL SERPL-MCNC: 135 MG/DL
TSH SERPL-ACNC: 0.93 UIU/ML
WBC # FLD AUTO: 10.03 K/UL

## 2020-07-10 ENCOUNTER — RX RENEWAL (OUTPATIENT)
Age: 50
End: 2020-07-10

## 2020-07-21 ENCOUNTER — NON-APPOINTMENT (OUTPATIENT)
Age: 50
End: 2020-07-21

## 2020-07-21 ENCOUNTER — APPOINTMENT (OUTPATIENT)
Dept: CARDIOLOGY | Facility: CLINIC | Age: 50
End: 2020-07-21
Payer: COMMERCIAL

## 2020-07-21 VITALS
SYSTOLIC BLOOD PRESSURE: 136 MMHG | WEIGHT: 218 LBS | HEIGHT: 64 IN | HEART RATE: 72 BPM | DIASTOLIC BLOOD PRESSURE: 76 MMHG | BODY MASS INDEX: 37.22 KG/M2 | TEMPERATURE: 97.5 F

## 2020-07-21 PROCEDURE — 93000 ELECTROCARDIOGRAM COMPLETE: CPT

## 2020-07-21 PROCEDURE — 99214 OFFICE O/P EST MOD 30 MIN: CPT

## 2020-07-22 NOTE — PHYSICAL EXAM
[General Appearance - Well Developed] : well developed [Normal Appearance] : normal appearance [No Deformities] : no deformities [General Appearance - Well Nourished] : well nourished [Well Groomed] : well groomed [General Appearance - In No Acute Distress] : no acute distress [Normal Conjunctiva] : the conjunctiva exhibited no abnormalities [Eyelids - No Xanthelasma] : the eyelids demonstrated no xanthelasmas [Normal Jugular Venous A Waves Present] : normal jugular venous A waves present [No Jugular Venous Giles A Waves] : no jugular venous giles A waves [Normal Jugular Venous V Waves Present] : normal jugular venous V waves present [Respiration, Rhythm And Depth] : normal respiratory rhythm and effort [Exaggerated Use Of Accessory Muscles For Inspiration] : no accessory muscle use [Heart Rate And Rhythm] : heart rate and rhythm were normal [Auscultation Breath Sounds / Voice Sounds] : lungs were clear to auscultation bilaterally [Bowel Sounds] : normal bowel sounds [Abnormal Walk] : normal gait [Nail Clubbing] : no clubbing of the fingernails [Cyanosis, Localized] : no localized cyanosis [Skin Color & Pigmentation] : normal skin color and pigmentation [] : no rash [Impaired Insight] : insight and judgment were intact [Oriented To Time, Place, And Person] : oriented to person, place, and time [Affect] : the affect was normal [Mood] : the mood was normal [FreeTextEntry1] : No bruit.  Soft, non-tender.  Liver and spleen not palpable; aortic pulsation not palpable.

## 2020-07-22 NOTE — DISCUSSION/SUMMARY
[FreeTextEntry1] : \par Palps -  - will continue current dose of carvedilol and will continue to use metoprolol on an as needed basis.  We will arrange for an event monitor to record her rhythm during periods of palpitations, to clarify if this is being caused by recurrent atrial arrhythmia.\par \par HTN and mild hypertensive dz./diastolic dysfunction; BP control is acceptable at the present time.  She will continue on the present dosage of both telmisartan and Coreg dosage.  I reminded her to remain on a low salt diet; again encouraged weight loss.\par \par She will return in 2 months.

## 2020-08-02 ENCOUNTER — RX RENEWAL (OUTPATIENT)
Age: 50
End: 2020-08-02

## 2020-09-01 ENCOUNTER — NON-APPOINTMENT (OUTPATIENT)
Age: 50
End: 2020-09-01

## 2020-09-01 ENCOUNTER — APPOINTMENT (OUTPATIENT)
Dept: CARDIOLOGY | Facility: CLINIC | Age: 50
End: 2020-09-01
Payer: COMMERCIAL

## 2020-09-01 VITALS
SYSTOLIC BLOOD PRESSURE: 135 MMHG | OXYGEN SATURATION: 97 % | WEIGHT: 203 LBS | DIASTOLIC BLOOD PRESSURE: 85 MMHG | TEMPERATURE: 97.7 F | HEART RATE: 63 BPM | HEIGHT: 64 IN | BODY MASS INDEX: 34.66 KG/M2

## 2020-09-01 VITALS — WEIGHT: 215 LBS | BODY MASS INDEX: 36.9 KG/M2

## 2020-09-01 PROCEDURE — 93000 ELECTROCARDIOGRAM COMPLETE: CPT | Mod: 59

## 2020-09-01 PROCEDURE — 99215 OFFICE O/P EST HI 40 MIN: CPT

## 2020-09-01 RX ORDER — METOPROLOL TARTRATE 25 MG/1
25 TABLET, FILM COATED ORAL
Qty: 150 | Refills: 0 | Status: DISCONTINUED | COMMUNITY
Start: 2020-06-29 | End: 2020-09-01

## 2020-09-01 NOTE — DISCUSSION/SUMMARY
[FreeTextEntry1] : \par Palps - I reviewed the results of the recent event monitor with her.  I explained that paroxysmal atrial fibrillation is seen, likely caused by her history of hypertension/mild hypertensive heart disease and excess weight.\par As her Fan–Vasc score is 2, I advised her to start an anticoagulant, and gave her samples of Eliquis 5 mg twice daily and sent in a prescription for this.\par As neither carvedilol nor metoprolol have successfully suppress the arrhythmia, I think she needs to be treated with a true antiarrhythmic.  I gave her samples of Multaq to start 400 mg twice daily and sent a prescription for this to her drugstore as well.\par Given her young age, I think she should also see one of the electrophysiologist at [facility], for additional thoughts regarding choice of an optimal antiarrhythmic agent (I explained that most would require a brief inpatient stay to monitor for proarrhythmia) and also for discussion of a possible ablation procedure.\par I gave her a copy of the event monitor recording to give to her primary care M.D.\par \par HTN and mild hypertensive dz./diastolic dysfunction; BP control is reasonable although not perfect.  I asked her to continue on telmisartan and Coreg at the current dosage.  I reminded her to remain on a low salt diet.  \par \par I again discussed the importance of weight loss with her, both with regard to blood pressure control, and that this would also likely have a favorable effect with regard to the atrial arrhythmia.\par \par She will return in 3 weeks.  At that time, I will also discuss with her arranging for stress test; while I think it unlikely that she has underlying coronary artery disease, for thoroughness sake, we should screened for this.

## 2020-09-01 NOTE — HISTORY OF PRESENT ILLNESS
[FreeTextEntry1] : She has a history of HTN and palpitations.  Since I saw her last in July, she used an event monitor.  It showed episodes of paroxysmal atrial fibrillation when she was having symptoms last week.  August 26 was a particularly symptomatic day, with multiple episodes over the course of the day.  \par \par With her usual activities, she describes no exertional chest discomfort or dyspnea. She describes no lightheadedness or LOC.   There have been no episodes of orthopnea or PND.  She continues on Coreg CR 40 mg. qd as well as telmisartan/HCT for treatment of HTN.    PRN use of metoprolol has been generally ineffective at suppressing her palpitations.  Her internist recently switched her from Coreg to propranolol, but his has not been effective either.

## 2020-09-01 NOTE — ASSESSMENT
[FreeTextEntry1] : Palps - current event monitor shows episodes of paroxysmal atrial fibrillation.  Prior recordings in 2/2019 had shown isolated VPCs and occasional runs of PAT..\par \par HTN/mild hypertensive heart disease.\par \par Overweight.\par

## 2020-09-14 ENCOUNTER — RX RENEWAL (OUTPATIENT)
Age: 50
End: 2020-09-14

## 2020-09-23 ENCOUNTER — APPOINTMENT (OUTPATIENT)
Dept: CARDIOLOGY | Facility: CLINIC | Age: 50
End: 2020-09-23
Payer: COMMERCIAL

## 2020-09-23 ENCOUNTER — NON-APPOINTMENT (OUTPATIENT)
Age: 50
End: 2020-09-23

## 2020-09-23 VITALS
WEIGHT: 213 LBS | OXYGEN SATURATION: 95 % | SYSTOLIC BLOOD PRESSURE: 142 MMHG | BODY MASS INDEX: 36.37 KG/M2 | HEIGHT: 64 IN | HEART RATE: 72 BPM | RESPIRATION RATE: 14 BRPM | DIASTOLIC BLOOD PRESSURE: 78 MMHG

## 2020-09-23 PROCEDURE — 99214 OFFICE O/P EST MOD 30 MIN: CPT

## 2020-09-23 PROCEDURE — 93000 ELECTROCARDIOGRAM COMPLETE: CPT

## 2020-09-23 NOTE — HISTORY OF PRESENT ILLNESS
[FreeTextEntry1] : She has a history of HTN and palpitations.  When I last saw her, an event monitor had shown episodes of paroxysmal atrial fibrillation correlating to her palpitations.  At that time, she started Eliquis and advised her to start Eliquis.  \par \par As she returns, Multaq has been helpful but not completely effective.  Currently, she experiences palps for up to one hour or so twice weekly, likely due to recurrent PAF.  She reports no problems with the meds and no bleeding with Eliquis.  She continues her usual activities & reports no episodes of exertional chest discomfort or dyspnea. She describes no lightheadedness or LOC.   There have been no episodes of orthopnea or PND.  \par \par She continues on Coreg CR 40 mg. qd as well as telmisartan/HCT for treatment of HTN.

## 2020-09-23 NOTE — ASSESSMENT
[FreeTextEntry1] : Palps - recent  event monitor showed episodes of paroxysmal atrial fibrillation.  Prior recordings in 2/2019 had shown isolated VPCs and occasional runs of PAT..\par \par HTN/mild hypertensive heart disease.\par \par Overweight.\par

## 2020-09-23 NOTE — DISCUSSION/SUMMARY
[FreeTextEntry1] : \par Palps - due to PAF.  Will continue Multaq for now.  She has an appt. with Dr. Oseguera next week, to review other therapeutic options - ?other anti-arrhythmic, ?ablation.\par \par As her Fan–Vasc score is 2, to continue Eliquis 5 mg twice daily.\par \par HTN - Continue carvedilol ER and telmisartan/HCT. Continue low salt diet.  \par \par Renew efforts at weight loss.\par \par Although she has not ischemic sxs., at some point will consider a stress test or cardiac CTA to assess for CAD. \par \par She will return in 4-6 weeks.

## 2020-09-23 NOTE — REASON FOR VISIT
Verified Results  CBC WITH AUTOMATED DIFFERENTIAL 31Jan2018 10:52AM JIM BRIONES   Ordering Provider: JIM BRIONES   [Jan 31, 2018 12:39PM JIM BRIONES]  Lab tests look basically good.     Test Name Result Flag Reference   WHITE BLOOD COUNT 5.2 K/mcL  4.2-11.0   RED CELL COUNT 4.38 mil/mcL  4.00-5.20   HEMOGLOBIN 13.5 g/dl  12.0-15.5   HEMATOCRIT 41.5 %  36.0-46.5   MEAN CORPUSCULAR VOLUME 94.7 fL  78.0-100.0   MEAN CORPUSCULAR HEMOGLOBIN 30.8 pg  26.0-34.0   MEAN CORPUSCULAR HGB CONC 32.5 g/dl  32.0-36.5   RDW-CV 13.2 %  11.0-15.0   PLATELET COUNT 199 K/mcL  140-450   RITCHIE% 70 %     LYM% 23 %     MON% 6 %     EOS% 1 %     BASO% 0 %     RITCHIE ABS 3.6 K/mcL  1.8-7.7   LYM ABS 1.2 K/mcL  1.0-4.0   MON ABS 0.3 K/mcL  0.3-0.9   EOS ABS 0.1 K/mcL  0.1-0.5   BASO ABS 0.0 K/mcL  0.0-0.3   SEG% NOT APPLICABLE     DIFF TYPE      AUTOMATED DIFFERENTIAL   ANALYZER ANC NOT APPLICABLE     PERCENT NRBC NOT APPLICABLE       COMP METABOLIC PNL 31Jan2018 10:52AM JIM BRIONES   Ordering Provider: JIM BRIONES   [Jan 31, 2018 12:39PM JIM BRIONES]  Lab tests look basically good.     Test Name Result Flag Reference   SODIUM 144 mmol/L  135-145   POTASSIUM 4.3 mmol/L  3.4-5.1   CHLORIDE 107 mmol/L     CARBON DIOXIDE 28 mmol/L  21-32   ANION GAP 13 mmol/L  10-20   GLUCOSE 90 mg/dl  65-99   BUN 24 mg/dl H 6-20   CREATININE 1.25 mg/dl H 0.51-0.95   GFR EST.AFRICAN AMER 53     eGFR 30-59 mL/min/1.73m2 = Moderate decrease in kidney function. Stage 3 CKD (chronic kidney disease) or moderate kidney disease.   GFR EST.NONAFRI AMER 46     eGFR 30-59 mL/min/1.73m2 = Moderate decrease in kidney function. Stage 3 CKD (chronic kidney disease) or moderate kidney disease.   BUN/CREATININE RATIO 19  7-25   BILIRUBIN TOTAL 0.3 mg/dl  0.2-1.0   GOT/AST 22 Units/L  <38   ALKALINE PHOSPHATASE 81 Units/L     ALBUMIN 4.1 g/dl  3.6-5.1   TOTAL PROTEIN 7.6 g/dl  6.4-8.2   GLOBULIN (CALCULATED) 3.5 g/dl  2.0-4.0   A/G RATIO 1.2  1.0-2.4    CALCIUM 10.0 mg/dl  8.4-10.2   GPT/ALT 33 Units/L  <79   FASTING STATUS 14 hrs       T4, FREE 31Jan2018 10:52AM JIM BRIONES   Ordering Provider: JIM BRIONES   [Jan 31, 2018 12:39PM JIM BRIONES]  Lab tests look basically good.     Test Name Result Flag Reference   FREE T4 1.6 ng/dl H 0.8-1.5     LIPID PNL 31Jan2018 10:52AM JIM BRIONES   Ordering Provider: JIM BRIONES   [Jan 31, 2018 12:39PM JIM BRIONES]  Lab tests look basically good.     Test Name Result Flag Reference   FASTING STATUS 14 hrs     CHOLESTEROL 254 mg/dl H <200   Desirable            <200  Borderline High      200 to 239  High                 >=240   HDL CHOLESTEROL 74 mg/dl  >49   Low            <40  Borderline Low 40 to 49  Near Optimal   50 to 59  Optimal        >=60   TRIGLYCERIDES 78 mg/dl  <150   Normal                   <150  Borderline High          150 to 199  High                     200 to 499  Very High                >=500   LDL CHOLESTEROL (CALCULATED) 164 mg/dl H <130   OPTIMAL               <100  NEAR OPTIMAL          100-129  BORDERLINE HIGH       130-159  HIGH                  160-189  VERY HIGH             >=190   NON-HDL CHOLESTEROL 180 mg/dl     Therapeutic Target:  CHD and risk equivalents <130  Multiple risk factors    <160  0 to 1 risk factors      <190   CHOLESTEROL/HDL RATIO 3.4  <4.5     TSH 31Jan2018 10:52AM JIM BRIONES   Ordering Provider: JIM BRIONES   [Jan 31, 2018 12:39PM JIM BRIONES]  Lab tests look basically good.     Test Name Result Flag Reference   TSH 2.601 mcUnits/mL  0.350-5.000        [FreeTextEntry1] : \par Rachel Brown returns for f/u regarding palpitations, PAF & HTN.

## 2020-09-29 ENCOUNTER — NON-APPOINTMENT (OUTPATIENT)
Age: 50
End: 2020-09-29

## 2020-09-30 ENCOUNTER — APPOINTMENT (OUTPATIENT)
Dept: ELECTROPHYSIOLOGY | Facility: CLINIC | Age: 50
End: 2020-09-30
Payer: COMMERCIAL

## 2020-09-30 VITALS
SYSTOLIC BLOOD PRESSURE: 149 MMHG | RESPIRATION RATE: 14 BRPM | DIASTOLIC BLOOD PRESSURE: 82 MMHG | OXYGEN SATURATION: 95 % | WEIGHT: 215 LBS | HEIGHT: 64 IN | BODY MASS INDEX: 36.7 KG/M2 | HEART RATE: 64 BPM

## 2020-09-30 PROCEDURE — 99204 OFFICE O/P NEW MOD 45 MIN: CPT

## 2020-09-30 PROCEDURE — 93000 ELECTROCARDIOGRAM COMPLETE: CPT

## 2020-10-01 ENCOUNTER — NON-APPOINTMENT (OUTPATIENT)
Age: 50
End: 2020-10-01

## 2020-10-01 NOTE — REASON FOR VISIT
[FreeTextEntry1] : Consultation for paroxysmal Atrial Fibrillation as seen on Holter monitor. \par Cardiologist: Yeison Hebert MD

## 2020-10-01 NOTE — PHYSICAL EXAM
[General Appearance - Well Developed] : well developed [General Appearance - Well Nourished] : well nourished [Normal Conjunctiva] : the conjunctiva exhibited no abnormalities [Eyelids - No Xanthelasma] : the eyelids demonstrated no xanthelasmas [Normal Jugular Venous V Waves Present] : normal jugular venous V waves present [Heart Sounds] : normal S1 and S2 [Murmurs] : no murmurs present [Arterial Pulses Normal] : the arterial pulses were normal [Edema] : no peripheral edema present [Auscultation Breath Sounds / Voice Sounds] : lungs were clear to auscultation bilaterally [Lungs Percussion] : the lungs were normal to percussion [Bowel Sounds] : normal bowel sounds [Abdomen Soft] : soft [Abdomen Tenderness] : non-tender [Abnormal Walk] : normal gait [Nail Clubbing] : no clubbing of the fingernails [Cyanosis, Localized] : no localized cyanosis [Skin Color & Pigmentation] : normal skin color and pigmentation [No Venous Stasis] : no venous stasis [Skin Lesions] : no skin lesions [Oriented To Time, Place, And Person] : oriented to person, place, and time [Affect] : the affect was normal [Mood] : the mood was normal [FreeTextEntry1] : NCAT

## 2020-10-01 NOTE — DISCUSSION/SUMMARY
[FreeTextEntry1] : She has symptoms of brief PAF and ectopy despite dronedarone and would prefer not to be on medications. She should be a good candidate for PVI with possible substrate ablation if indicated. I have quoted her a 70-80% success rate and < 0.1% risk of serious complications. She would like to proceed. Given her relatively young age and low CHADSVASC, I would consider stopping OAC after 4-6 weeks post ablation if no AF recurs. She may also benefit from a sleep study for NANDA

## 2020-10-01 NOTE — REVIEW OF SYSTEMS
[Dyspnea on exertion] : dyspnea during exertion [Palpitations] : palpitations [Fever] : no fever [Headache] : no headache [Recent Weight Gain (___ Lbs)] : no recent weight gain [Chills] : no chills [Feeling Fatigued] : not feeling fatigued [Recent Weight Loss (___ Lbs)] : no recent weight loss [Blurry Vision] : no blurred vision [Eyeglasses] : not currently wearing eyeglasses [Earache] : no earache [Shortness Of Breath] : no shortness of breath [Chest  Pressure] : no chest pressure [Chest Pain] : no chest pain [Lower Ext Edema] : no extremity edema [Leg Claudication] : no intermittent leg claudication [Cough] : no cough [Wheezing] : no wheezing [Abdominal Pain] : no abdominal pain [Nausea] : no nausea [Heartburn] : no heartburn [Dysphagia] : no dysphagia [Joint Pain] : no joint pain [Joint Swelling] : no joint swelling [Muscle Cramps] : no muscle cramps [Skin: A Rash] : no rash: [Dizziness] : no dizziness [Tremor] : no tremor was seen [Convulsions] : no convulsions [Tingling (Paresthesia)] : no tingling [Confusion] : no confusion was observed [Anxiety] : no anxiety [Excessive Thirst] : no polydipsia [Easy Bleeding] : no tendency for easy bleeding

## 2020-10-01 NOTE — HISTORY OF PRESENT ILLNESS
[FreeTextEntry1] : 49 year old woman w/PMHx of HTN and palpitations, now with paroxysmal Atrial Fibrillation observed on a  Holter monitor that correlates with her palpitations.  CHADSVASC 1.  She is currently managed on 40mg Carvedilol Phosphate ER daily, 5mg Eliquis BID, and 400mg Multaq BID.  While the Multaq has been helpful in alleviating her symptoms, it has not been completely effective.  She continues to experience palpitations up to an hour or two twice a week, likely due to recurrent PAF. Echocardiogram shows normal biventricular systolic function and normal SHAILESH (24). She is able to continue her daily activities without exertional discomfort, dyspnea, lightheadedness, or syncope. She gives a history of possible NANDA with snoring at night. TSH is normal She presents to our office today for discussion of other treatment options.     \par Her CardioMobile strips show PACs, PVCs which she feels. She is desirous to be off medications if possible. \par \par

## 2020-10-18 DIAGNOSIS — Z01.818 ENCOUNTER FOR OTHER PREPROCEDURAL EXAMINATION: ICD-10-CM

## 2020-10-19 ENCOUNTER — APPOINTMENT (OUTPATIENT)
Dept: DISASTER EMERGENCY | Facility: CLINIC | Age: 50
End: 2020-10-19

## 2020-10-20 LAB — SARS-COV-2 N GENE NPH QL NAA+PROBE: NOT DETECTED

## 2020-10-21 ENCOUNTER — TRANSCRIPTION ENCOUNTER (OUTPATIENT)
Age: 50
End: 2020-10-21

## 2020-10-21 ENCOUNTER — INPATIENT (INPATIENT)
Facility: HOSPITAL | Age: 50
LOS: 0 days | Discharge: HOME | DRG: 274 | End: 2020-10-22
Attending: INTERNAL MEDICINE | Admitting: INTERNAL MEDICINE
Payer: COMMERCIAL

## 2020-10-21 VITALS
RESPIRATION RATE: 18 BRPM | HEIGHT: 64 IN | WEIGHT: 214.95 LBS | SYSTOLIC BLOOD PRESSURE: 161 MMHG | OXYGEN SATURATION: 98 % | DIASTOLIC BLOOD PRESSURE: 94 MMHG | HEART RATE: 67 BPM | TEMPERATURE: 98 F

## 2020-10-21 DIAGNOSIS — I48.0 PAROXYSMAL ATRIAL FIBRILLATION: ICD-10-CM

## 2020-10-21 DIAGNOSIS — Z98.890 OTHER SPECIFIED POSTPROCEDURAL STATES: Chronic | ICD-10-CM

## 2020-10-21 LAB
ANION GAP SERPL CALC-SCNC: 14 MMOL/L — SIGNIFICANT CHANGE UP (ref 5–17)
APTT BLD: 41.4 SEC — HIGH (ref 27.5–35.5)
BLD GP AB SCN SERPL QL: NEGATIVE — SIGNIFICANT CHANGE UP
BUN SERPL-MCNC: 20 MG/DL — SIGNIFICANT CHANGE UP (ref 7–23)
CALCIUM SERPL-MCNC: 9.3 MG/DL — SIGNIFICANT CHANGE UP (ref 8.4–10.5)
CHLORIDE SERPL-SCNC: 101 MMOL/L — SIGNIFICANT CHANGE UP (ref 96–108)
CO2 SERPL-SCNC: 23 MMOL/L — SIGNIFICANT CHANGE UP (ref 22–31)
CREAT SERPL-MCNC: 0.65 MG/DL — SIGNIFICANT CHANGE UP (ref 0.5–1.3)
GLUCOSE SERPL-MCNC: 123 MG/DL — HIGH (ref 70–99)
HCG SERPL-ACNC: <2 MIU/ML — SIGNIFICANT CHANGE UP
HCT VFR BLD CALC: 41.3 % — SIGNIFICANT CHANGE UP (ref 34.5–45)
HGB BLD-MCNC: 13.1 G/DL — SIGNIFICANT CHANGE UP (ref 11.5–15.5)
INR BLD: 0.96 RATIO — SIGNIFICANT CHANGE UP (ref 0.88–1.16)
MCHC RBC-ENTMCNC: 28.8 PG — SIGNIFICANT CHANGE UP (ref 27–34)
MCHC RBC-ENTMCNC: 31.7 GM/DL — LOW (ref 32–36)
MCV RBC AUTO: 90.8 FL — SIGNIFICANT CHANGE UP (ref 80–100)
NRBC # BLD: 0 /100 WBCS — SIGNIFICANT CHANGE UP (ref 0–0)
PLATELET # BLD AUTO: 272 K/UL — SIGNIFICANT CHANGE UP (ref 150–400)
POTASSIUM SERPL-MCNC: 5.5 MMOL/L — HIGH (ref 3.5–5.3)
POTASSIUM SERPL-SCNC: 5.5 MMOL/L — HIGH (ref 3.5–5.3)
PROTHROM AB SERPL-ACNC: 11.5 SEC — SIGNIFICANT CHANGE UP (ref 10.6–13.6)
RBC # BLD: 4.55 M/UL — SIGNIFICANT CHANGE UP (ref 3.8–5.2)
RBC # FLD: 12.9 % — SIGNIFICANT CHANGE UP (ref 10.3–14.5)
RH IG SCN BLD-IMP: POSITIVE — SIGNIFICANT CHANGE UP
SODIUM SERPL-SCNC: 138 MMOL/L — SIGNIFICANT CHANGE UP (ref 135–145)
WBC # BLD: 9.83 K/UL — SIGNIFICANT CHANGE UP (ref 3.8–10.5)
WBC # FLD AUTO: 9.83 K/UL — SIGNIFICANT CHANGE UP (ref 3.8–10.5)

## 2020-10-21 PROCEDURE — 93655 ICAR CATH ABLTJ DSCRT ARRHYT: CPT

## 2020-10-21 PROCEDURE — 93010 ELECTROCARDIOGRAM REPORT: CPT

## 2020-10-21 PROCEDURE — 93662 INTRACARDIAC ECG (ICE): CPT | Mod: 26

## 2020-10-21 PROCEDURE — 93613 INTRACARDIAC EPHYS 3D MAPG: CPT

## 2020-10-21 PROCEDURE — 93656 COMPRE EP EVAL ABLTJ ATR FIB: CPT

## 2020-10-21 RX ORDER — TELMISARTAN AND HYDROCHLOROTHIAZIDE 40; 12.5 MG/1; MG/1
1 TABLET ORAL
Qty: 0 | Refills: 0 | DISCHARGE

## 2020-10-21 RX ORDER — CARVEDILOL PHOSPHATE 80 MG/1
25 CAPSULE, EXTENDED RELEASE ORAL EVERY 12 HOURS
Refills: 0 | Status: DISCONTINUED | OUTPATIENT
Start: 2020-10-21 | End: 2020-10-21

## 2020-10-21 RX ORDER — HYDROCHLOROTHIAZIDE 25 MG
25 TABLET ORAL DAILY
Refills: 0 | Status: DISCONTINUED | OUTPATIENT
Start: 2020-10-21 | End: 2020-10-22

## 2020-10-21 RX ORDER — CARVEDILOL PHOSPHATE 80 MG/1
1 CAPSULE, EXTENDED RELEASE ORAL
Qty: 0 | Refills: 0 | DISCHARGE

## 2020-10-21 RX ORDER — APIXABAN 2.5 MG/1
5 TABLET, FILM COATED ORAL EVERY 12 HOURS
Refills: 0 | Status: DISCONTINUED | OUTPATIENT
Start: 2020-10-21 | End: 2020-10-22

## 2020-10-21 RX ORDER — PANTOPRAZOLE SODIUM 20 MG/1
40 TABLET, DELAYED RELEASE ORAL
Refills: 0 | Status: DISCONTINUED | OUTPATIENT
Start: 2020-10-21 | End: 2020-10-22

## 2020-10-21 RX ORDER — BENZOCAINE AND MENTHOL 5; 1 G/100ML; G/100ML
1 LIQUID ORAL
Refills: 0 | Status: DISCONTINUED | OUTPATIENT
Start: 2020-10-21 | End: 2020-10-22

## 2020-10-21 RX ORDER — LOSARTAN POTASSIUM 100 MG/1
100 TABLET, FILM COATED ORAL DAILY
Refills: 0 | Status: DISCONTINUED | OUTPATIENT
Start: 2020-10-21 | End: 2020-10-22

## 2020-10-21 RX ORDER — PANTOPRAZOLE SODIUM 20 MG/1
40 TABLET, DELAYED RELEASE ORAL ONCE
Refills: 0 | Status: COMPLETED | OUTPATIENT
Start: 2020-10-21 | End: 2020-10-21

## 2020-10-21 RX ORDER — CARVEDILOL PHOSPHATE 80 MG/1
12.5 CAPSULE, EXTENDED RELEASE ORAL EVERY 12 HOURS
Refills: 0 | Status: DISCONTINUED | OUTPATIENT
Start: 2020-10-21 | End: 2020-10-22

## 2020-10-21 RX ORDER — APIXABAN 2.5 MG/1
1 TABLET, FILM COATED ORAL
Qty: 0 | Refills: 0 | DISCHARGE

## 2020-10-21 RX ADMIN — LOSARTAN POTASSIUM 100 MILLIGRAM(S): 100 TABLET, FILM COATED ORAL at 15:58

## 2020-10-21 RX ADMIN — BENZOCAINE AND MENTHOL 1 LOZENGE: 5; 1 LIQUID ORAL at 18:36

## 2020-10-21 RX ADMIN — CARVEDILOL PHOSPHATE 12.5 MILLIGRAM(S): 80 CAPSULE, EXTENDED RELEASE ORAL at 18:36

## 2020-10-21 RX ADMIN — PANTOPRAZOLE SODIUM 40 MILLIGRAM(S): 20 TABLET, DELAYED RELEASE ORAL at 15:00

## 2020-10-21 RX ADMIN — APIXABAN 5 MILLIGRAM(S): 2.5 TABLET, FILM COATED ORAL at 18:36

## 2020-10-21 NOTE — PRE-ANESTHESIA EVALUATION ADULT - NSANTHOSAYNRD_GEN_A_CORE
No. NANDA screening performed.  STOP BANG Legend: 0-2 = LOW Risk; 3-4 = INTERMEDIATE Risk; 5-8 = HIGH Risk

## 2020-10-21 NOTE — CHART NOTE - NSCHARTNOTEFT_GEN_A_CORE
Last Name: Kevin   First Name: Rachel    MR# [id number]         : 70  Date of Procedure: 10/21/2020    : Gaetano Oseguera [facility]  Assistant: Edmund Luke MD    Referring physician:  Yeison Hebert MD    Procedures performed:   1.	Successful ablation of PVI (32495)  2.	Additional procedures: ICE (47239)  3.	Additional procedures: 3D mapping (48047)  4.	Additional procedures: Additional ablation of 1st discrete arrhythmia-CTI Flutter (75622)    Preprocedure Diagnosis:   1.	Atrial Fibrillation Type: Paroxysmal  2.	Symptoms: Dyspnea  3.	Symptoms: Fatigue  4.	Antiarrhythmic drugs failed or intolerance: Dronedarone    5.	NYHA Class: I  6.	Left Atrial Volume Index: 24 (normal)  7.	Left atrial diameter: 3.6  cm  8.	LVEF: 55-70%  9.	Underlying heart disease: None  10.	Non cardiac disease: Hypertension, possible NANDA  11.	[id number]:  1  12.	Familial AF: No Genetic testing: Not done  13.	Previous Ablation type: None    Post procedure diagnosis:  Successful Pulmonary vein isolation and CTI flutter ablation  3D mapping system: CARTO  Imaging: ICE/EAM  Procedure Time:    178    minutes    Time to Transeptal:      37     minutes      LA Dwell time:    90minutes  Anesthesia/Sedation: GET with EP Anesthesia   Esophageal Temperature monitoring:  Yes   Maximum temp increase 0.6 degrees C  EBL: 50cc              I/O 1800 in no Carter  Complications: None  Fluoroscopy: 0 Minutes     Preprocedure ALEXANDRA: no  Implanted Device Present: None  Procedural Anticoagulation: 07085 Units IV heparin bolus and 2000 units/hour to keep -400  secs  Last dose OAC: Apixaban 36 hour prior  Protamine 50 mg IV was given  at end of procedure    Brief history: 49 year old woman with paroxysmal atrial fibrillation with RVR and moderate to severe symptoms refractory to beta-blockers. Has mild concentric LVH. Started on dronedarone but wishes to discontinue the medication.  Procedure details:  The patient was brought to the EP lab in the post absorptive nonsedated state after informed consent was obtained then prepped and draped in the usual manner. She was intubated and formal time out performed. The sheaths were placed as following using Seldinger technique using US guidance and the patient was anticoagulated.   Sheaths and Catheters:  RFV:  9F  Cordis  long sheath with a   Biosense  Soundstar catheter to the RA  RFV:  8.0F  Cordis  long sheath with a   Biosense  7F decapolar  catheter to the CS  RFV:  8.5F  Alejandre  SL1 Sheath replaced with an SL0 sheath with a   Biosense  7F Smart Touch FJ alternating with Pentaray catheter to the LA  Arterial pressure monitoring: Noninvasive  Transeptal needle:  Bowie RF      Hemodynamics:   RA pressure initial:  14mm Hg    LA pressure initial:    17mm Hg          RA pressure final:    14mm Hg    LA pressure final:   17mm Hg            Baseline Rhythm: Normal sinus  The ICE catheter was advanced to the RA and a sound map of the LA, PV’s, mitral annulus, DUSTY and esophagus was created after respiratory gating was performed. The ablation catheter was advanced to the RA (respiratory gating was redone if needed) and the catheter force was zeroed. A volumetric map of the SVC, RA, IVC and CS was created with the ablation catheter and used to place the CS catheter.   The ablation catheter was used to tent the fossa ovalis under ICE and the sheath was advanced until the sheath was seen tenting the fossa. The ablation catheter was removed and the dilator with the Transeptal needle 2 cm proximal to the tip was advanced into the sheath as it was held against the fossa. The dilator was then advanced until it tented the fossa. The Transeptal needle was advanced under ICE and pressure guidance until LA access was obtained with RF energy, based on ICE and pressure waveform.  The dilator was slightly advanced and the needle was removed. A 0.032 J wire was advanced through the dilator into either the LSPV or LIPV and the dilator and sheath were advanced into the LA. The dilator and wire were removed and the Pentaray catheter was advanced into the LA for High density mapping during sinus and atrial arrhythmias.   Ablation was performed using 50 W for 7-10 seconds using Ablation Index of 300-350 for the posterior wall and 450-500. This was decreased or increased depending on the thickness of tissue and proximity to the esophagus.  The initial map of the sinus rhythm showed extensive muscle extensions into all four PV’s. The voltage and activation maps in sinus showed normal voltage and conduction velocity.   The initial lesion set delivered:  Wide area encircling ablation of all four veins was performed during rhythm of sinus without issue after HD map with the Pentaray showed normal voltage and extensive muscular venous extensions.  PVI isolation confirmed by:  Pacing from veins and CS ?   and HD mapping which showed small areas of reconnection in the left latoya and ridge which were ablated.  Pacing on the ablation lines at high output ?     showed no conduction  Post ablation, Programmed stimulation was performed    Pacing stimulation included triple extrastimuli at 400 ms in CS as well as burst down to 200 ms which induced nonsustained atrial flutter. Given RA enlargement and probably NANDA, I proceeded to ablate the CTI and achieved bidirectional block with pacing.  At the end neither atrial flutter nor atrial fibrillation were inducible despite very aggressive coupling intervals (400/240/220/200/200) and burst pacing repeatedly at 200 ms  Final rhythm:  Sinus  rate of   90 bpm  WY:  165  ms.  QRS:  82 ms  QT: 430   QTc:   356  normal ECGIntervals:  AH: 72 ms  VA conduction: Yes with VA Wenckebach via retrograde slow pathway at 590ms, no retrograde fast conduction     HV interval:  48 ms  RVA ERP: 400/290      Conclusions: Successful left atrial substrate ablation which eliminated atrial fibrillation and no inducible arrhythmias post ablation.

## 2020-10-21 NOTE — PRE-ANESTHESIA EVALUATION ADULT - NSANTHASARD_GEN_ALL_CORE
Occupational Therapy   Treatment    Name: Buddy Park  MRN: [id number]  Admitting Diagnosis:  Cerebrovascular accident (CVA) due to occlusion of small artery       Recommendations:     Discharge Recommendations: rehabilitation facility  Discharge Equipment Recommendations:  other (see comments)(TBD at next level of care)  Barriers to discharge:       Assessment:     Buddy Park is a 67 y.o. male with a medical diagnosis of Cerebrovascular accident (CVA) due to occlusion of small artery.  He presents with increased activity tolerance, awareness to deficit, insight and attention, decreased impulsivity. No AROM of L UE, improving AROM of L LE, and improved attention to L side. Seated in arm chair with feet on the floor, albin techniques for hand washing and oral hygiene introduced and pt performed with SBA. Pt displays no signs of fatigue after session today.     Performance deficits affecting function are weakness, impaired endurance, impaired sensation, impaired self care skills, impaired functional mobilty, impaired balance, gait instability, decreased upper extremity function, decreased lower extremity function, decreased safety awareness, decreased ROM, impaired fine motor.     Rehab Prognosis:  Good; patient would benefit from acute skilled OT services to address these deficits and reach maximum level of function.       Plan:     Patient to be seen 6 x/week to address the above listed problems via self-care/home management, therapeutic activities, therapeutic exercises, neuromuscular re-education  · Plan of Care Expires: 02/25/20  · Plan of Care Reviewed with: patient    Subjective     Pain/Comfort:  · Pain Rating 1: 0/10    Objective:     Communicated with: Natividad SANCHEZ prior to session.  Patient found up in chair with recliner in use with telemetry, peripheral IV upon OT entry to room.    General Precautions: Standard, aspiration, fall, hearing impaired   Orthopedic Precautions:N/A   Braces: N/A      Occupational Performance:     Activities of Daily Living:  · Grooming: stand by assistance while seated and using albin techniques for L hand washing and oral hygiene, pt opening all items using mouth and R hand, turning head L to wash L UE elevated on lap and washing, rinsing, and drying all areas from elbow to fingers, despite no sensation in the limb.      [facility] 6 Click ADL:      Treatment & Education:  -TherEx; Gentle mobilization of L metacarpals, carpals, radius and ulna with stretching of the surrounding soft tissues to decrease edema and improve PROM, potentially allowing for increases in active movement.  L hand/wrist extensor stretch 5 x 1 min each and pt stating sensation of dorsal wrist and MCPs while in extension.  -TAYLOR ed for attention to detail with the L UE 2* no sensation in it currently. Turning head L, stretching the L levator allowing for increased active movement in the L shoulder.  -Pt v/u to all ed and demos when appropriate.      Patient left up inchair with recliner in use with all lines intact, call button in reach and door left opeEducation:      GOALS:   Multidisciplinary Problems     Occupational Therapy Goals        Problem: Occupational Therapy Goal    Goal Priority Disciplines Outcome Interventions   Occupational Therapy Goal     OT, PT/OT Ongoing, Progressing    Description:  Goals to be met by: 2/16/20     Patient will increase functional independence with ADLs by performing:    Patient will tolerate sitting EOB x 10 minutes with CGA to SBA to maintain midline  Grooming EOB with Min (A)  UB dressing EOB with Mod (A)                       Time Tracking:     OT Date of Treatment: 02/12/20  OT Start Time: 0950  OT Stop Time: 1018  OT Total Time (min): 28 min    Billable Minutes:Self Care/Home Management 15 mins  Therapeutic Exercise 13 mins    CLAUDIA Brizuela/YAMILET  2/12/2020     3

## 2020-10-21 NOTE — PROGRESS NOTE ADULT - SUBJECTIVE AND OBJECTIVE BOX
Pre-op Diagnosis:  Atrial fibrillation     Post-op Diagnosis:  Same     Procedure:  Pulmonary vein isolation by Radiofrequency ablation.  Cavotricuspid isthmus line ablation.    Electrophysiologist:  Gaetano Oseguera MD    Fellow : Edmund Luke MD      Anesthesia: General Anesthesia    Access:  Right femoral venous access x 3 (8F, 9F, 8.5 F)    Description:  Successful Pulmonary vein isolation and cavotricuspid isthmus line ablation.    Complications:  None     EBL:  Less than 15 ml     Disposition:  To recovery room    Plan:  Bed rest for 6 hours.  Suture removal from right groin at 7 pm.  Resume apixaban tonight after suture removal.

## 2020-10-21 NOTE — H&P CARDIOLOGY - HISTORY OF PRESENT ILLNESS
This is a 49 year old woman LMP first week of 10/2020 with PMH of obesity, breast CA with lumpectomy left 2010 with node resection no chemo/radiation, HTN, palpitations,  paroxysmal Atrial Fibrillation (on Eliquis last dose 10/19/2020 PM dose and Multaq last dose 10/19/2020, Carvedilol last dose 10/20/2020) observed on a Holter monitor that correlates with her palpitations. CHADSVAS 1, presented to cardiology, Dr. Oseguera, for evaluation.  Pt. states Multaq has alleviated symptoms, but has not been completely effective.  Pt. has ongoing palpitations up to an hour to two hours twice a week. Echocardiogram revealed normal biventricular systolic function and normal SHAILESH (24). She is able to continue her daily activities without exertional discomfort, dyspnea, lightheadedness, or syncope. 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 She gives a history of possible NANDA with snoring at night. TSH is normal. Her CardioMobile strips revealed PACs, PVCs which she feels. She is desirous to be off medications if possible. Pt. presents asymptomatic for further evaluation and afib ablation.

## 2020-10-22 ENCOUNTER — TRANSCRIPTION ENCOUNTER (OUTPATIENT)
Age: 50
End: 2020-10-22

## 2020-10-22 VITALS
RESPIRATION RATE: 17 BRPM | TEMPERATURE: 98 F | DIASTOLIC BLOOD PRESSURE: 67 MMHG | SYSTOLIC BLOOD PRESSURE: 135 MMHG | HEART RATE: 78 BPM | OXYGEN SATURATION: 98 %

## 2020-10-22 LAB
ANION GAP SERPL CALC-SCNC: 12 MMOL/L — SIGNIFICANT CHANGE UP (ref 5–17)
BUN SERPL-MCNC: 15 MG/DL — SIGNIFICANT CHANGE UP (ref 7–23)
CALCIUM SERPL-MCNC: 8.5 MG/DL — SIGNIFICANT CHANGE UP (ref 8.4–10.5)
CHLORIDE SERPL-SCNC: 102 MMOL/L — SIGNIFICANT CHANGE UP (ref 96–108)
CO2 SERPL-SCNC: 23 MMOL/L — SIGNIFICANT CHANGE UP (ref 22–31)
CREAT SERPL-MCNC: 0.73 MG/DL — SIGNIFICANT CHANGE UP (ref 0.5–1.3)
GLUCOSE SERPL-MCNC: 150 MG/DL — HIGH (ref 70–99)
HCT VFR BLD CALC: 37.9 % — SIGNIFICANT CHANGE UP (ref 34.5–45)
HGB BLD-MCNC: 12 G/DL — SIGNIFICANT CHANGE UP (ref 11.5–15.5)
MCHC RBC-ENTMCNC: 28.6 PG — SIGNIFICANT CHANGE UP (ref 27–34)
MCHC RBC-ENTMCNC: 31.7 GM/DL — LOW (ref 32–36)
MCV RBC AUTO: 90.2 FL — SIGNIFICANT CHANGE UP (ref 80–100)
NRBC # BLD: 0 /100 WBCS — SIGNIFICANT CHANGE UP (ref 0–0)
PLATELET # BLD AUTO: 256 K/UL — SIGNIFICANT CHANGE UP (ref 150–400)
POTASSIUM SERPL-MCNC: 3.8 MMOL/L — SIGNIFICANT CHANGE UP (ref 3.5–5.3)
POTASSIUM SERPL-SCNC: 3.8 MMOL/L — SIGNIFICANT CHANGE UP (ref 3.5–5.3)
RBC # BLD: 4.2 M/UL — SIGNIFICANT CHANGE UP (ref 3.8–5.2)
RBC # FLD: 13.2 % — SIGNIFICANT CHANGE UP (ref 10.3–14.5)
SODIUM SERPL-SCNC: 137 MMOL/L — SIGNIFICANT CHANGE UP (ref 135–145)
WBC # BLD: 14.79 K/UL — HIGH (ref 3.8–10.5)
WBC # FLD AUTO: 14.79 K/UL — HIGH (ref 3.8–10.5)

## 2020-10-22 PROCEDURE — C1894: CPT

## 2020-10-22 PROCEDURE — 85730 THROMBOPLASTIN TIME PARTIAL: CPT

## 2020-10-22 PROCEDURE — 93656 COMPRE EP EVAL ABLTJ ATR FIB: CPT

## 2020-10-22 PROCEDURE — 86850 RBC ANTIBODY SCREEN: CPT

## 2020-10-22 PROCEDURE — 93662 INTRACARDIAC ECG (ICE): CPT

## 2020-10-22 PROCEDURE — 86901 BLOOD TYPING SEROLOGIC RH(D): CPT

## 2020-10-22 PROCEDURE — 86900 BLOOD TYPING SEROLOGIC ABO: CPT

## 2020-10-22 PROCEDURE — C1769: CPT

## 2020-10-22 PROCEDURE — C1730: CPT

## 2020-10-22 PROCEDURE — 85610 PROTHROMBIN TIME: CPT

## 2020-10-22 PROCEDURE — 93005 ELECTROCARDIOGRAM TRACING: CPT

## 2020-10-22 PROCEDURE — C1893: CPT

## 2020-10-22 PROCEDURE — 84702 CHORIONIC GONADOTROPIN TEST: CPT

## 2020-10-22 PROCEDURE — 93613 INTRACARDIAC EPHYS 3D MAPG: CPT

## 2020-10-22 PROCEDURE — 85027 COMPLETE CBC AUTOMATED: CPT

## 2020-10-22 PROCEDURE — C1732: CPT

## 2020-10-22 PROCEDURE — 93010 ELECTROCARDIOGRAM REPORT: CPT

## 2020-10-22 PROCEDURE — 93655 ICAR CATH ABLTJ DSCRT ARRHYT: CPT

## 2020-10-22 PROCEDURE — C1759: CPT

## 2020-10-22 PROCEDURE — 80048 BASIC METABOLIC PNL TOTAL CA: CPT

## 2020-10-22 RX ORDER — DRONEDARONE 400 MG/1
1 TABLET, FILM COATED ORAL
Qty: 0 | Refills: 0 | DISCHARGE

## 2020-10-22 RX ORDER — PANTOPRAZOLE SODIUM 20 MG/1
1 TABLET, DELAYED RELEASE ORAL
Qty: 30 | Refills: 0
Start: 2020-10-22 | End: 2020-11-20

## 2020-10-22 RX ADMIN — PANTOPRAZOLE SODIUM 40 MILLIGRAM(S): 20 TABLET, DELAYED RELEASE ORAL at 06:14

## 2020-10-22 RX ADMIN — APIXABAN 5 MILLIGRAM(S): 2.5 TABLET, FILM COATED ORAL at 06:15

## 2020-10-22 NOTE — DISCHARGE NOTE PROVIDER - CARE PROVIDER_API CALL
Emi Oseguera (MD; PhD)  Cardiac Electrophysiology; Cardiovascular Disease; Internal Medicine  [facility]  Dept of Cardiology, [address] [place] [address]  Phone: [phone]  Fax: [phone]  Scheduled Appointment: 12/02/2020 02:00 PM

## 2020-10-22 NOTE — CHART NOTE - NSCHARTNOTEFT_GEN_A_CORE
Nutrition follow up     Nutrition consult received for education on Consistent Carbohydrate diet before discharge.     Confirmed with pt and provider that pt has no history of DM but needs education on Consistent Carbohydrate for better BG control and weight loss. Provided education on Consistent Carbohydrate and weight loss nutrition therapy. Discussed foods containing carbohydrates, foods containing proteins, and portion sizes. Stressed the importance of a balanced meal to maintain blood glucose and help with gradual/healthy weight loss. Recommended water consumption with avoidance of soda and juice. Discussed to avoid concentrated sweets. Reviewed nutrition label reading. Provided handout with education. All questions answered.     RD remains available.   Cony Clinton MS RDN CDN #[phone]

## 2020-10-22 NOTE — DISCHARGE NOTE PROVIDER - NSDCFUSCHEDAPPT_GEN_ALL_CORE_FT
MIRIAN MAYO ; 10/28/2020 ; [facility] Cardio [facility]  MIRIAN MAYO ; 12/02/2020 ; [facility]PP Cardio Electro [address]

## 2020-10-22 NOTE — DISCHARGE NOTE PROVIDER - NSDCMRMEDTOKEN_GEN_ALL_CORE_FT
carvedilol 40 mg oral capsule, extended release: 1 cap(s) orally once a day (in the morning)  Eliquis 5 mg oral tablet: 1 tab(s) orally 2 times a day  telmisartan-hydrochlorothiazide 80mg-25mg oral tablet: 1 tab(s) orally once a day   carvedilol 40 mg oral capsule, extended release: 1 cap(s) orally once a day (in the morning)  Eliquis 5 mg oral tablet: 1 tab(s) orally 2 times a day  pantoprazole 40 mg oral delayed release tablet: 1 tab(s) orally once a day (before a meal)  telmisartan-hydrochlorothiazide 80mg-25mg oral tablet: 1 tab(s) orally once a day

## 2020-10-22 NOTE — DISCHARGE NOTE PROVIDER - NSDCCPCAREPLAN_GEN_ALL_CORE_FT
PRINCIPAL DISCHARGE DIAGNOSIS  Diagnosis: Atrial fibrillation  Assessment and Plan of Treatment: Continue with your cardiologist and primary care MD. Continue your current medications. Call your physician for palpitations, feelings of rapid heart beat, lightheadedness, or dizziness. Continue Eliquis as prescribed. Monitor your right groin site for swelling, bleeding with vital signs and PRN.      SECONDARY DISCHARGE DIAGNOSES  Diagnosis: HTN (hypertension)  Assessment and Plan of Treatment: Continue with your blood pressure medications; eat a heart healthy diet with low salt diet; exercise regularly (consult with your physician or cardiologist first); maintain a heart healthy weight; if you smoke - quit (A resource to help you stop smoking is the [facility] – phone number [phone].); include healthy ways to manage stress. Continue to follow with your primary care physician or cardiologist.

## 2020-10-22 NOTE — DISCHARGE NOTE PROVIDER - NSDCCPTREATMENT_GEN_ALL_CORE_FT
PRINCIPAL PROCEDURE  Procedure: Intracardiac catheter ablation for atrial fibrillation  Findings and Treatment: Afib ablation

## 2020-10-22 NOTE — DISCHARGE NOTE NURSING/CASE MANAGEMENT/SOCIAL WORK - PATIENT PORTAL LINK FT
You can access the FollowMyHealth Patient Portal offered by  by registering at the following website: [email or web address]. By joining [employer]’s FollowMyHealth portal, you will also be able to view your health information using other applications (apps) compatible with our system.

## 2020-10-22 NOTE — DISCHARGE NOTE PROVIDER - HOSPITAL COURSE
HPI:  This is a 49 year old woman LMP first week of 10/2020 with PMH of obesity, breast CA with lumpectomy left 2010 with node resection no chemo/radiation, HTN, palpitations,  paroxysmal Atrial Fibrillation (on Eliquis last dose 10/19/2020 PM dose and Multaq last dose 10/19/2020, Carvedilol last dose 10/20/2020) observed on a Holter monitor that correlates with her palpitations. CHADSVAS 1, presented to cardiology, Dr. Oseguera, for evaluation.  Pt. states Multaq has alleviated symptoms, but has not been completely effective.  Pt. has ongoing palpitations up to an hour to two hours twice a week. Echocardiogram revealed normal biventricular systolic function and normal SHAILESH (24). She is able to continue her daily activities without exertional discomfort, dyspnea, lightheadedness, or syncope. 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 TSH is normal. Her CardioMobile strips revealed PACs, PVCs which she feels. She is desirous to be off medications if possible. Pt. presents asymptomatic for further evaluation and afib ablation.   (21 Oct 2020 06:58).    10/21 Afib ablation. Right groin site without swelling, bleeding.

## 2020-10-22 NOTE — DISCHARGE NOTE PROVIDER - NSDCCAREPROVSEEN_GEN_ALL_CORE_FT
Gaetano Oseguera  [facility] Cardiac Electrophysiology, Team

## 2020-10-22 NOTE — DISCHARGE NOTE PROVIDER - NSDCPNSUBOBJ_GEN_ALL_CORE
Patient is a 49y old  Female who presents with a chief complaint of Atrial fibrillation (22 Oct 2020 01:08)          Allergies    No Known Allergies    Intolerances        Medications:  apixaban 5 milliGRAM(s) Oral every 12 hours  benzocaine 15 mG/menthol 3.6 mG (Sugar-Free) Lozenge 1 Lozenge Oral every 1 hour PRN  carvedilol 12.5 milliGRAM(s) Oral every 12 hours  hydrochlorothiazide 25 milliGRAM(s) Oral daily  losartan 100 milliGRAM(s) Oral daily  pantoprazole    Tablet 40 milliGRAM(s) Oral before breakfast      Vitals:  T(C): 37.3 (10-21-20 @ 15:35), Max: 37.3 (10-21-20 @ 15:35)  HR: 80 (10-22-20 @ 00:35) (67 - 89)  BP: 117/60 (10-22-20 @ 00:35) (117/60 - 179/85)  BP(mean): 116 (10-21-20 @ 06:58) (116 - 116)  RR: 17 (10-22-20 @ 00:35) (14 - 18)  SpO2: 97% (10-22-20 @ 00:35) (94% - 98%)  Wt(kg): --  Daily Height in cm: 162.56 (21 Oct 2020 07:43)    Daily Weight in k.5 (21 Oct 2020 06:58)  I&O's Summary    21 Oct 2020 07:01  -  22 Oct 2020 01:21  --------------------------------------------------------  IN: 240 mL / OUT: 250 mL / NET: -10 mL          Physical Exam:  Appearance: Normal  Eyes: PERRL, EOMI  HENT: Normal oral mus[place]a, NC/AT  Cardiovascular: S1S2, RRR, No M/R/G, no JVD, No Lower extremity edema  Procedural Access Site: No hematoma, Non-tender to palpation, 2+ pulse, No bruit, No Ecchymosis  Respiratory: Clear to auscultation bilaterally  Gastrointestinal: Soft, Non tender, Normal Bowel Sounds  Musculoskeletal: No clubbing, No joint deformity   Neurologic: Non-focal  Lymphatic: No lymphadenopathy  Psychiatry: AAOx3, Mood & affect appropriate  Skin: No rashes, No ecchymoses, No cyanosis    10-21    138  |  101  |  20  ----------------------------<  123<H>  5.5<H>   |  23  |  0.65    Ca    9.3      21 Oct 2020 07:12      PT/INR - ( 21 Oct 2020 07:12 )   PT: 11.5 sec;   INR: 0.96 ratio         PTT - ( 21 Oct 2020 07:12 )  PTT:41.4 sec        Lipid panel   Hgb A1c                         12.0   14.79 )-----------( 256      ( 22 Oct 2020 01:00 )             37.9         ECG: SR 81 bpm    Electrophysiology: Afib ablation    Imaging:    Interpretation of Telemetry:

## 2020-10-28 ENCOUNTER — APPOINTMENT (OUTPATIENT)
Dept: CARDIOLOGY | Facility: CLINIC | Age: 50
End: 2020-10-28
Payer: COMMERCIAL

## 2020-10-28 NOTE — HISTORY OF PRESENT ILLNESS
[FreeTextEntry1] : She has a history of HTN and palpitations.  Recent event monitor showed episodes of paroxysmal atrial fibrillation correlating to her palpitations.  She is on  Eliquis.  \par \par Since I saw her last, she saw Dr. Oseguera and atrial fib ablation \par \par \par As she returns, Multaq has been helpful but not completely effective.  Currently, she experiences palps for up to one hour or so twice weekly, likely due to recurrent PAF.  She reports no problems with the meds and no bleeding with Eliquis.  She continues her usual activities & reports no episodes of exertional chest discomfort or dyspnea. She describes no lightheadedness or LOC.   There have been no episodes of orthopnea or PND.  \par \par She continues on Coreg CR 40 mg. qd as well as telmisartan/HCT for treatment of HTN.

## 2020-10-28 NOTE — DISCUSSION/SUMMARY
[FreeTextEntry1] : PRELIMINARY NOTE\par \par \par Palps - due to PAF.  Will continue Multaq for now.  She has an appt. with Dr. Oseguera next week, to review other therapeutic options - ?other anti-arrhythmic, ?ablation.\par \par As her Fan–Vasc score is 2, to continue Eliquis 5 mg twice daily.\par \par HTN - Continue carvedilol ER and telmisartan/HCT. Continue low salt diet.  \par \par Renew efforts at weight loss.\par \par Although she has not ischemic sxs., at some point will consider a stress test or cardiac CTA to assess for CAD. \par \par She will return in 4-6 weeks.

## 2020-10-28 NOTE — REASON FOR VISIT
[FreeTextEntry1] : PRELIMINARY NOTE\par \par \par Rachel Brown returns for f/u regarding palpitations, PAF & HTN.

## 2020-10-28 NOTE — PHYSICAL EXAM
[General Appearance - Well Developed] : well developed [Normal Appearance] : normal appearance [Well Groomed] : well groomed [General Appearance - Well Nourished] : well nourished [No Deformities] : no deformities [General Appearance - In No Acute Distress] : no acute distress [Normal Conjunctiva] : the conjunctiva exhibited no abnormalities [Eyelids - No Xanthelasma] : the eyelids demonstrated no xanthelasmas [Normal Jugular Venous A Waves Present] : normal jugular venous A waves present [Normal Jugular Venous V Waves Present] : normal jugular venous V waves present [No Jugular Venous Giles A Waves] : no jugular venous giles A waves [Respiration, Rhythm And Depth] : normal respiratory rhythm and effort [Exaggerated Use Of Accessory Muscles For Inspiration] : no accessory muscle use [Auscultation Breath Sounds / Voice Sounds] : lungs were clear to auscultation bilaterally [Heart Rate And Rhythm] : heart rate and rhythm were normal [Bowel Sounds] : normal bowel sounds [Abnormal Walk] : normal gait [Nail Clubbing] : no clubbing of the fingernails [Cyanosis, Localized] : no localized cyanosis [FreeTextEntry1] : Trace edema, L>R [Skin Color & Pigmentation] : normal skin color and pigmentation [] : no rash [Oriented To Time, Place, And Person] : oriented to person, place, and time [Impaired Insight] : insight and judgment were intact [Affect] : the affect was normal [Mood] : the mood was normal

## 2020-11-11 ENCOUNTER — APPOINTMENT (OUTPATIENT)
Dept: CARDIOLOGY | Facility: CLINIC | Age: 50
End: 2020-11-11
Payer: COMMERCIAL

## 2020-11-11 ENCOUNTER — NON-APPOINTMENT (OUTPATIENT)
Age: 50
End: 2020-11-11

## 2020-11-11 VITALS
BODY MASS INDEX: 36.7 KG/M2 | DIASTOLIC BLOOD PRESSURE: 70 MMHG | HEART RATE: 74 BPM | WEIGHT: 215 LBS | HEIGHT: 64 IN | SYSTOLIC BLOOD PRESSURE: 130 MMHG | OXYGEN SATURATION: 96 %

## 2020-11-11 PROCEDURE — 93000 ELECTROCARDIOGRAM COMPLETE: CPT

## 2020-11-11 PROCEDURE — 99214 OFFICE O/P EST MOD 30 MIN: CPT

## 2020-11-11 PROCEDURE — 99072 ADDL SUPL MATRL&STAF TM PHE: CPT

## 2020-11-11 RX ORDER — DRONEDARONE 400 MG/1
400 TABLET, FILM COATED ORAL
Qty: 180 | Refills: 1 | Status: DISCONTINUED | COMMUNITY
Start: 2020-09-01 | End: 2020-11-11

## 2020-11-11 RX ORDER — METOPROLOL TARTRATE 25 MG/1
25 TABLET, FILM COATED ORAL
Refills: 0 | Status: ACTIVE | COMMUNITY

## 2020-11-11 NOTE — ASSESSMENT
[FreeTextEntry1] : Paroxysmal atrial fibrillation s/p ablation by Dr. Oseguera on Oct. 21, 2020.  Prior event recordings in 2/2019 had also shown isolated VPCs and occasional runs of PAT..\par \par HTN/mild hypertensive heart disease.\par \par Overweight.\par

## 2020-11-11 NOTE — HISTORY OF PRESENT ILLNESS
[FreeTextEntry1] : She has a history of HTN and palpitations.  Recent event monitor showed episodes of paroxysmal atrial fibrillation correlating to her palpitations.  She is on  Eliquis.  \par \par Since I saw her last, she saw Dr. Oseguera and atrial fib ablation was performed at [facility] on Oct. 21.  She is off Multaq.  She reports rare palps since; on one occasion she awakened at 1 am with palps and a HR of 140.  She took 25 mg. of Lopressor and in 30 min. she returned to normal rhythm.  \par \par She continues on Eliquis at present.  She reports no exertional chest discomfort or dyspnea. She describes no lightheadedness or LOC.   There have been no episodes of orthopnea or PND.

## 2020-12-01 NOTE — HISTORY OF PRESENT ILLNESS
[FreeTextEntry1] : 50F w/PMHx of paroxysmal Atrial Fibrillation w/RVR and moderate to severe symptoms refractory to beta blocker therapy.  She was started on Dronedarone, but wished to discontinue the medication.  She is now s/p successful left atrial substrated ablation on 10/21/20 and presents for a routine post-ablation follow-up evaluation.

## 2020-12-01 NOTE — REASON FOR VISIT
[Follow-Up - Clinic] : a clinic follow-up of [FreeTextEntry2] : s/p Afib ablation [FreeTextEntry1] : Cardiologist: Yeison Hebert MD

## 2020-12-02 ENCOUNTER — APPOINTMENT (OUTPATIENT)
Dept: ELECTROPHYSIOLOGY | Facility: CLINIC | Age: 50
End: 2020-12-02

## 2020-12-16 ENCOUNTER — APPOINTMENT (OUTPATIENT)
Dept: ELECTROPHYSIOLOGY | Facility: CLINIC | Age: 50
End: 2020-12-16

## 2020-12-16 PROBLEM — C50.919 MALIGNANT NEOPLASM OF UNSPECIFIED SITE OF UNSPECIFIED FEMALE BREAST: Chronic | Status: ACTIVE | Noted: 2020-10-21

## 2020-12-16 PROBLEM — I48.91 UNSPECIFIED ATRIAL FIBRILLATION: Chronic | Status: ACTIVE | Noted: 2020-10-21

## 2020-12-16 PROBLEM — E66.9 OBESITY, UNSPECIFIED: Chronic | Status: ACTIVE | Noted: 2020-10-21

## 2020-12-16 PROBLEM — I10 ESSENTIAL (PRIMARY) HYPERTENSION: Chronic | Status: ACTIVE | Noted: 2020-10-21

## 2020-12-23 ENCOUNTER — NON-APPOINTMENT (OUTPATIENT)
Age: 50
End: 2020-12-23

## 2020-12-23 ENCOUNTER — APPOINTMENT (OUTPATIENT)
Dept: ELECTROPHYSIOLOGY | Facility: CLINIC | Age: 50
End: 2020-12-23
Payer: COMMERCIAL

## 2020-12-23 ENCOUNTER — APPOINTMENT (OUTPATIENT)
Dept: ELECTROPHYSIOLOGY | Facility: CLINIC | Age: 50
End: 2020-12-23

## 2020-12-23 VITALS
HEIGHT: 64 IN | DIASTOLIC BLOOD PRESSURE: 81 MMHG | HEART RATE: 77 BPM | WEIGHT: 215 LBS | OXYGEN SATURATION: 96 % | BODY MASS INDEX: 36.7 KG/M2 | SYSTOLIC BLOOD PRESSURE: 142 MMHG

## 2020-12-23 DIAGNOSIS — R00.2 PALPITATIONS: ICD-10-CM

## 2020-12-23 DIAGNOSIS — E66.3 OVERWEIGHT: ICD-10-CM

## 2020-12-23 DIAGNOSIS — I49.3 VENTRICULAR PREMATURE DEPOLARIZATION: ICD-10-CM

## 2020-12-23 PROCEDURE — 93000 ELECTROCARDIOGRAM COMPLETE: CPT

## 2020-12-23 PROCEDURE — 99072 ADDL SUPL MATRL&STAF TM PHE: CPT

## 2020-12-23 PROCEDURE — 99214 OFFICE O/P EST MOD 30 MIN: CPT

## 2020-12-23 RX ORDER — APIXABAN 5 MG/1
5 TABLET, FILM COATED ORAL
Qty: 180 | Refills: 1 | Status: DISCONTINUED | COMMUNITY
Start: 2020-09-01 | End: 2020-12-23

## 2020-12-23 NOTE — PHYSICAL EXAM
[General Appearance - Well Developed] : well developed [Normal Appearance] : normal appearance [Well Groomed] : well groomed [General Appearance - Well Nourished] : well nourished [No Deformities] : no deformities [General Appearance - In No Acute Distress] : no acute distress [Normal Conjunctiva] : the conjunctiva exhibited no abnormalities [Normal Oral Mucosa] : normal oral mucosa [Normal Jugular Venous V Waves Present] : normal jugular venous V waves present [] : no respiratory distress [Respiration, Rhythm And Depth] : normal respiratory rhythm and effort [Exaggerated Use Of Accessory Muscles For Inspiration] : no accessory muscle use [Auscultation Breath Sounds / Voice Sounds] : lungs were clear to auscultation bilaterally [Chest Palpation] : palpation of the chest revealed no abnormalities [Lungs Percussion] : the lungs were normal to percussion [Arterial Pulses Normal] : the arterial pulses were normal [Abdomen Tenderness] : non-tender [Abnormal Walk] : normal gait [Cyanosis, Localized] : no localized cyanosis [Skin Color & Pigmentation] : normal skin color and pigmentation [Oriented To Time, Place, And Person] : oriented to person, place, and time

## 2020-12-27 NOTE — DISCUSSION/SUMMARY
[FreeTextEntry1] : Overall, she has been feeling well post-ablation, with only one brief episode of Atrial Fibrillation 10 days after the procedure.  She does however, report VPDs, which interestingly were not present on her previous recorded EKGs or Holter monitor reports.  We will obtain a 30 day Biotel monitor to evaluate the frequent of her VPDs to determine whether ablation is warranted. She did not have any during her AF ablation and therefore, if ablation is considered, it may need to be done with minimal sedation.  Given her heavy nighttime snoring, a referral for a home sleep study was also provided since correction of NANDA may ameliorate RVOT VPD's.

## 2020-12-27 NOTE — HISTORY OF PRESENT ILLNESS
[FreeTextEntry1] : 50F w/PMHx of paroxysmal Atrial Fibrillation with RVR and moderate to severe symptoms refractory to beta blocker therapy, who was started on Dronedarone, but wished to discontinue the medication and is now s/p successful left atrial substrate ablation on 10/21/20.  She has had no further Atrial Fibrillation aside from one 30 minute episode 10 days post-ablation.  She does, however, complain of VPDs that are, at times, bigeminal or trigeminal, but interestingly were not seen on her past 30 day monitor reports, nor captured on previous EKGs, which all demonstrate sinus rhythm without ectopy.  She presented some examples of captured VPDs on her Solidariumle device.  She does snore heavily, which is suggestive of NANDA.  Notably RVOT VPDs are more common in this setting.

## 2020-12-27 NOTE — REVIEW OF SYSTEMS
[see HPI] : see HPI [Palpitations] : palpitations [Fever] : no fever [Headache] : no headache [Recent Weight Gain (___ Lbs)] : no recent weight gain [Chills] : no chills [Feeling Fatigued] : not feeling fatigued [Recent Weight Loss (___ Lbs)] : no recent weight loss [Earache] : no earache [Shortness Of Breath] : no shortness of breath [Dyspnea on exertion] : not dyspnea during exertion [Chest  Pressure] : no chest pressure [Chest Pain] : no chest pain [Lower Ext Edema] : no extremity edema [Leg Claudication] : no intermittent leg claudication [Cough] : no cough [Abdominal Pain] : no abdominal pain [Nausea] : no nausea [Vomiting] : no vomiting [Heartburn] : no heartburn [Change in Appetite] : no change in appetite [Dysphagia] : no dysphagia [Dysuria] : no dysuria [Joint Pain] : no joint pain [Joint Swelling] : no joint swelling [Joint Stiffness] : no joint stiffness [Muscle Cramps] : no muscle cramps [Limb Weakness (Paresis)] : no limb weakness [Skin: A Rash] : no rash: [Dizziness] : no dizziness [Tremor] : no tremor was seen [Numbness (Hypesthesia)] : no numbness [Convulsions] : no convulsions [Tingling (Paresthesia)] : no tingling [Confusion] : no confusion was observed [Excessive Thirst] : no polydipsia [Easy Bleeding] : no tendency for easy bleeding

## 2020-12-27 NOTE — REASON FOR VISIT
[Follow-Up - Clinic] : a clinic follow-up of [FreeTextEntry2] : s/p Atrial Fibrillation ablation on 10/21/20

## 2021-01-15 ENCOUNTER — FORM ENCOUNTER (OUTPATIENT)
Age: 51
End: 2021-01-15

## 2021-03-06 ENCOUNTER — RX RENEWAL (OUTPATIENT)
Age: 51
End: 2021-03-06

## 2021-03-24 ENCOUNTER — RX RENEWAL (OUTPATIENT)
Age: 51
End: 2021-03-24

## 2021-04-28 ENCOUNTER — APPOINTMENT (OUTPATIENT)
Dept: PULMONOLOGY | Facility: CLINIC | Age: 51
End: 2021-04-28
Payer: COMMERCIAL

## 2021-04-28 VITALS
RESPIRATION RATE: 16 BRPM | BODY MASS INDEX: 36.37 KG/M2 | SYSTOLIC BLOOD PRESSURE: 146 MMHG | WEIGHT: 213 LBS | DIASTOLIC BLOOD PRESSURE: 83 MMHG | HEIGHT: 64 IN | TEMPERATURE: 97.6 F | HEART RATE: 81 BPM | OXYGEN SATURATION: 96 %

## 2021-04-28 PROCEDURE — 99072 ADDL SUPL MATRL&STAF TM PHE: CPT

## 2021-04-28 PROCEDURE — 99204 OFFICE O/P NEW MOD 45 MIN: CPT

## 2021-04-28 NOTE — REVIEW OF SYSTEMS
[EDS: ESS=____] : daytime somnolence: ESS=[unfilled] [Fatigue] : no fatigue [Recent Wt Gain (___ Lbs)] : no recent weight gain [Recent Wt Loss (___ Lbs)] : no recent weight loss [Nasal Congestion] : no nasal congestion [Snoring] : snoring [Postnasal Drip] : no postnasal drip [Witnessed Apneas] : witnessed apnea [Shortness Of Breath] : no shortness of breath [A.M. Dry Mouth] : a.m. dry mouth [Orthopnea] : no orthopnea [Palpitations] : palpitations [Obesity] : obesity [A.M. Headache] : headache present upon awakening [Heartburn] : heartburn [Nocturia] : nocturia [Difficulty Initiating Sleep] : no difficulty falling asleep [Difficulty Maintaining Sleep] : no difficulty maintaining sleep [Lower Extremity Discomfort] : no lower extremity discomfort [Unusual Sleep Behavior] : no unusual sleep behavior [Hypersomnolence] : not sleeping much more than usual [Negative] : Psychiatric [FreeTextEntry9] : per hpi

## 2021-04-28 NOTE — CONSULT LETTER
[Dear  ___] : Dear  [unfilled], [Consult Letter:] : I had the pleasure of evaluating your patient, [unfilled]. [( Thank you for referring [unfilled] for consultation for _____ )] : Thank you for referring [unfilled] for consultation for [unfilled] [Please see my note below.] : Please see my note below. [Consult Closing:] : Thank you very much for allowing me to participate in the care of this patient.  If you have any questions, please do not hesitate to contact me. [Sincerely,] : Sincerely, [FreeTextEntry2] : Dr. Gaetano Oseguera [FreeTextEntry3] : Holli Green MD\par  \par Division of Pulmonary, Critical Care & Sleep Medicine\par [facility]\par \par \par  [DrBeny  ___] : Dr. MAR

## 2021-04-28 NOTE — ASSESSMENT
[FreeTextEntry1] : A/P: 49 yo F with moderate NANDA, HTN, Afib s/p LA ablation, obesity presents for initial evaluation of sleep disordered breathing.  \par \par Will order APAP to initiate therapy with supplies, including Nasal pillows mask.\par Instructed to make a follow up appointment with me 4-6 weeks after initiating therapy\par Counseled on the importance of weight loss and its positive impact on the severity of sleep apnea.\par Explained the rationale for diagnosis and treatment of sleep apnea including its effect on quality of life and long term cardiovascular risk. \par Above discussed at length, all questions answered, and she appears to understand. \par Will call the office with any issues or concerns as needed.\par \par \par

## 2021-04-28 NOTE — HISTORY OF PRESENT ILLNESS
[FreeTextEntry1] : 51 yo F presents for initial evaluation of sleep disordered breathing\par Referred by Dr. Oseguera. \par Main complaints of nonrestorative sleep, severe snoring and daytime somnolence.\par \par Sleep history: \par Variable sleep schedule - works nights 3 nights a week, other 2 days.\par Bed: 11 am, no difficultly falling asleep, wakes at 3-4 pm, out of bed by 5 pm\par Night 11 pm to 6 am, wakes to urinate at 4-5 am. Typically goes back to bed 9-10 am. Active throughout the day. No naps. \par Few incidences of waking with palpitations - few seconds long. \par Unrefreshed upon awakening. 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 Severe during REM sleep, REM AHI 53.8/hr. Not positional.   [Obstructive Sleep Apnea] : obstructive sleep apnea

## 2021-04-29 ENCOUNTER — RX RENEWAL (OUTPATIENT)
Age: 51
End: 2021-04-29

## 2021-04-29 ENCOUNTER — NON-APPOINTMENT (OUTPATIENT)
Age: 51
End: 2021-04-29

## 2021-04-29 ENCOUNTER — APPOINTMENT (OUTPATIENT)
Dept: CARDIOLOGY | Facility: CLINIC | Age: 51
End: 2021-04-29
Payer: COMMERCIAL

## 2021-04-29 VITALS
WEIGHT: 212 LBS | DIASTOLIC BLOOD PRESSURE: 84 MMHG | BODY MASS INDEX: 36.19 KG/M2 | HEART RATE: 69 BPM | SYSTOLIC BLOOD PRESSURE: 134 MMHG | HEIGHT: 64 IN | OXYGEN SATURATION: 95 % | TEMPERATURE: 97.7 F

## 2021-04-29 DIAGNOSIS — I48.0 PAROXYSMAL ATRIAL FIBRILLATION: ICD-10-CM

## 2021-04-29 DIAGNOSIS — I10 ESSENTIAL (PRIMARY) HYPERTENSION: ICD-10-CM

## 2021-04-29 DIAGNOSIS — R60.0 LOCALIZED EDEMA: ICD-10-CM

## 2021-04-29 PROCEDURE — 93000 ELECTROCARDIOGRAM COMPLETE: CPT

## 2021-04-29 PROCEDURE — 99214 OFFICE O/P EST MOD 30 MIN: CPT

## 2021-04-29 PROCEDURE — 99072 ADDL SUPL MATRL&STAF TM PHE: CPT

## 2021-04-29 NOTE — PHYSICAL EXAM
[General Appearance - Well Developed] : well developed [Normal Appearance] : normal appearance [Well Groomed] : well groomed [General Appearance - Well Nourished] : well nourished [No Deformities] : no deformities [General Appearance - In No Acute Distress] : no acute distress [Normal Conjunctiva] : the conjunctiva exhibited no abnormalities [Eyelids - No Xanthelasma] : the eyelids demonstrated no xanthelasmas [Normal Jugular Venous A Waves Present] : normal jugular venous A waves present [Normal Jugular Venous V Waves Present] : normal jugular venous V waves present [No Jugular Venous Giles A Waves] : no jugular venous giles A waves [Respiration, Rhythm And Depth] : normal respiratory rhythm and effort [Exaggerated Use Of Accessory Muscles For Inspiration] : no accessory muscle use [Auscultation Breath Sounds / Voice Sounds] : lungs were clear to auscultation bilaterally [Heart Rate And Rhythm] : heart rate and rhythm were normal [Bowel Sounds] : normal bowel sounds [Abnormal Walk] : normal gait [Nail Clubbing] : no clubbing of the fingernails [Cyanosis, Localized] : no localized cyanosis [Skin Color & Pigmentation] : normal skin color and pigmentation [] : no rash [Oriented To Time, Place, And Person] : oriented to person, place, and time [Impaired Insight] : insight and judgment were intact [Affect] : the affect was normal [Mood] : the mood was normal [FreeTextEntry1] : Mild LE edema, L>R

## 2021-04-29 NOTE — HISTORY OF PRESENT ILLNESS
[FreeTextEntry1] : She has a history of HTN and palpitations due to PAF.  She saw Dr. Oseguera and atrial fib ablation was performed last Oct. 21. She had one episode of PAF 10 days post procedure which resolved.  When she saw Dr. Oseguera, PVCs were appreciated. A 30 monitor was advised.  She also had a home sleep study which revealed moderate NANDA.  \par \par She saw Dr. Green for pulmonary evaluation yesterday. APAP was advised.\par \par As she returns today, she is doing well.  She reports only rare palps.  There have been no episodes of exertional chest discomfort or dyspnea. She reports no lightheadedness or LOC.   There have been no episodes of orthopnea or PND.  \par \par She notices mild LE edema, L>R after sitting or standing for extended periods.

## 2021-04-29 NOTE — DISCUSSION/SUMMARY
[FreeTextEntry1] : \par PAF s/p ablation.  Remains in NSR.  Continue metoprolol tartrate 25 mg. prn.  She tells me that Eliquis was d/c'd by Dr. Oseguera.\par \par Will check with Dr. Oseguera re Biotel recording.\par \par HTN - Continue carvedilol ER and telmisartan/HCT; new Rx sent to [facility].  Again advised importance of weight loss; prefer this to adding more meds.  Continue low salt diet.\par \par F/U with pulmonologist; APAP as advised.\par \par Given Rx to have LE Doppler done to r/o LE DVT (seems unlikely; suspect venous insufficiency).\par \par She will return in 2 months.

## 2021-04-30 ENCOUNTER — APPOINTMENT (OUTPATIENT)
Dept: CARDIOLOGY | Facility: CLINIC | Age: 51
End: 2021-04-30
Payer: COMMERCIAL

## 2021-04-30 VITALS — BODY MASS INDEX: 36.19 KG/M2 | WEIGHT: 212 LBS | HEIGHT: 64 IN

## 2021-04-30 PROCEDURE — 97802 MEDICAL NUTRITION INDIV IN: CPT | Mod: 95

## 2021-04-30 RX ORDER — CARVEDILOL PHOSPHATE 40 MG/1
40 CAPSULE, EXTENDED RELEASE ORAL DAILY
Qty: 90 | Refills: 1 | Status: ACTIVE | COMMUNITY
Start: 2019-04-02 | End: 1900-01-01

## 2021-04-30 RX ORDER — TELMISARTAN AND HYDROCHLOROTHIAZIDE 80; 25 MG/1; MG/1
80-25 TABLET ORAL
Qty: 90 | Refills: 1 | Status: ACTIVE | COMMUNITY
Start: 2019-09-24 | End: 1900-01-01

## 2021-05-19 ENCOUNTER — APPOINTMENT (OUTPATIENT)
Dept: CARDIOLOGY | Facility: CLINIC | Age: 51
End: 2021-05-19
Payer: COMMERCIAL

## 2021-05-19 PROCEDURE — 97803 MED NUTRITION INDIV SUBSEQ: CPT | Mod: 95

## 2021-06-11 ENCOUNTER — APPOINTMENT (OUTPATIENT)
Dept: CARDIOLOGY | Facility: CLINIC | Age: 51
End: 2021-06-11
Payer: COMMERCIAL

## 2021-06-11 PROCEDURE — 97803 MED NUTRITION INDIV SUBSEQ: CPT | Mod: 95

## 2021-07-02 ENCOUNTER — APPOINTMENT (OUTPATIENT)
Dept: CARDIOLOGY | Facility: CLINIC | Age: 51
End: 2021-07-02

## 2021-07-20 ENCOUNTER — APPOINTMENT (OUTPATIENT)
Dept: PULMONOLOGY | Facility: CLINIC | Age: 51
End: 2021-07-20
Payer: COMMERCIAL

## 2021-07-20 DIAGNOSIS — G47.33 OBSTRUCTIVE SLEEP APNEA (ADULT) (PEDIATRIC): ICD-10-CM

## 2021-07-20 DIAGNOSIS — E66.9 OBESITY, UNSPECIFIED: ICD-10-CM

## 2021-07-20 PROCEDURE — 99214 OFFICE O/P EST MOD 30 MIN: CPT | Mod: 95

## 2021-07-22 PROBLEM — G47.33 OBSTRUCTIVE SLEEP APNEA, ADULT: Status: ACTIVE | Noted: 2021-04-28

## 2021-07-22 PROBLEM — E66.9 OBESITY, CLASS II, BMI 35-39.9: Status: ACTIVE | Noted: 2021-04-28

## 2021-07-22 NOTE — REVIEW OF SYSTEMS
[Snoring] : snoring [Witnessed Apneas] : witnessed apnea [A.M. Dry Mouth] : a.m. dry mouth [Palpitations] : palpitations [Obesity] : obesity [A.M. Headache] : headache present upon awakening [Heartburn] : heartburn [Nocturia] : nocturia [Negative] : Psychiatric [EDS: ESS=____] : no daytime somnolence [Fatigue] : no fatigue [Recent Wt Gain (___ Lbs)] : no recent weight gain [Recent Wt Loss (___ Lbs)] : no recent weight loss [Nasal Congestion] : no nasal congestion [Postnasal Drip] : no postnasal drip [Shortness Of Breath] : no shortness of breath [Orthopnea] : no orthopnea [Difficulty Initiating Sleep] : no difficulty falling asleep [Difficulty Maintaining Sleep] : no difficulty maintaining sleep [Lower Extremity Discomfort] : no lower extremity discomfort [Unusual Sleep Behavior] : no unusual sleep behavior [Hypersomnolence] : not sleeping much more than usual [FreeTextEntry8] : improved symptoms with APAP [FreeTextEntry9] : per hpi [FreeTextEntry5] : improved with APAP

## 2021-07-22 NOTE — CONSULT LETTER
[DrBeny  ___] : Dr. MAR [Dear  ___] : Dear  [unfilled], [Courtesy Letter:] : I had the pleasure of seeing your patient, [unfilled], in my office today. [Please see my note below.] : Please see my note below. [Sincerely,] : Sincerely, [FreeTextEntry2] : Dr. Gaetano Oseguera [FreeTextEntry3] : Holli Green MD\par  \par Division of Pulmonary, Critical Care & Sleep Medicine\par [facility]\par \par \par

## 2021-07-22 NOTE — ASSESSMENT
[FreeTextEntry1] : 49 yo F with paroxysmal Afib s/p LA ablation 10/21/20, HTN, obesity, moderate NANDA recently started on APAP therapy presents for follow up. \par Overall deriving benefit and compliant, therapeutic AHI <5/hr\par Will continue with therapy and instructed on obtaining new supplies and switching them on a regular basis.\par \par Discussed the recent [employer] voluntary recall for Continuous and Non-Continuous Ventilators (certain CPAP, BiLevel PAP and Ventilator Devices) due to two issues related to the polyester-based polyurethane (PE-PUR) sound abatement foam used in these devices.  The potential harm of inhalation or ingestion of the foam particles was discussed in detail with the patient.  Symptoms such as headache, upper airway irritation, cough, chest pressure and sinus infection were discussed at length with the patient.  \par Discussed various options such as halting therapy until he can have his current device repaired or fully replaced or purchase a new machine - Dreamstation 2 or Resmed Airsense 10 or 11. \par \par I will send a new prescription to DME to request a replacement machine as she just recently initiated therapy on 5/13/21. \par \par Patient also provided with information to register her machine on the [employer] website.\par \par Given that the patient has moderate to severe sleep disordered breathing, the decision was made to continue therapy after a very thorough discussion of the risks and benefits of continued use until their repaired or fully replaced\par Patient was counseled to not use ozone-related cleaning products and adhere to their device Instructions for Use for approved cleaning methods.\par The dangers of drowsy driving were discussed with the patient.  The patient was warned to avoid drowsy driving. \par Counseled on lifestyle modifications to promote weight loss and the benefits of weight loss for overall health and potentially sleep apnea severity. \par \par \par \par

## 2021-07-22 NOTE — HISTORY OF PRESENT ILLNESS
[Obstructive Sleep Apnea] : obstructive sleep apnea [Home] : at home, [unfilled] , at the time of the visit. [Medical Office: ([facility])___] : at the medical office located in  [Verbal consent obtained from patient] : the patient, [unfilled] [FreeTextEntry1] : 51 yo F with paroxysmal Afib s/p LA ablation 10/21/20, HTN, obesity, moderate NANDA recently started on APAP therapy presents for follow up. \par \par 4/28/2021:\par  initial evaluation of sleep disordered breathing\par Referred by Dr. Oseguera. \par Main complaints of nonrestorative sleep, severe snoring and daytime somnolence.\par \par Sleep history: \par Variable sleep schedule - works nights 3 nights a week, other 2 days.\par Bed: 11 am, no difficultly falling asleep, wakes at 3-4 pm, out of bed by 5 pm\par Night 11 pm to 6 am, wakes to urinate at 4-5 am. Typically goes back to bed 9-10 am. Active throughout the day. No naps. \par Few incidences of waking with palpitations - few seconds long. \par Unrefreshed upon awakening. 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 Severe during REM sleep, REM AHI 53.8/hr. Not positional.  \par \par \par Follow up Televisit 7/20/21:\par Doing well, tolerating APAP and deriving benefit - sleeping better, less frequent wake ups to urinate  and wakes more refreshed.\par Data download for compliance and therapy:\par Machine: Dreamstation APAP\par DME: [facility]\par Settings: 4-20 cmH2O\par Mask: nasal pillows\par Percent days with usage: 76.7%\par Average hours: 6 hours 4 min\par Treatment AHI: 5.9\par Large leak: no\par \par Quality metrics:\par Tobacco use: NONSMOKER\par ESS: 5\par \par Vaccines: \par COVID: completed 2021\par Influenza: receives annually\par Pneumococcal: NA\par

## 2021-08-17 NOTE — HISTORY OF PRESENT ILLNESS
----- Message from Rancho Monroy MD sent at 8/17/2021  3:21 PM CDT -----  His potassium level is within normal range now.  No further workup.  Follow-up as scheduled  
Pt informed of message below and verbalized understanding with no questions.  
[FreeTextEntry1] : She has a history of HTN and palpitations.  over the past few months, she reports increasing palps.  A KeepTrax laura showed periods of irregular tachycardia up to HR of 140 bpm; one can not conclusively determine if the arrhythmia is A.F. or some other SVT.  \par \par Since I saw her last, she reports one episode of palpitations, lasting for approximately an hour before she fell asleep.  She took metoprolol, which did provide some relief of symptoms after approximately 45-60 minutes.\par \par She reports no exertional chest discomfort or dyspnea. She describes no lightheadedness or LOC.   There have been no episodes of orthopnea or PND. Other than a cold during the winter, she reports no other interval medical problems.  The dose of Coreg CR was increased to 40 mg. qd since I last saw her.

## 2021-09-22 ENCOUNTER — TRANSCRIPTION ENCOUNTER (OUTPATIENT)
Age: 51
End: 2021-09-22